# Patient Record
Sex: FEMALE | Race: ASIAN | NOT HISPANIC OR LATINO | URBAN - METROPOLITAN AREA
[De-identification: names, ages, dates, MRNs, and addresses within clinical notes are randomized per-mention and may not be internally consistent; named-entity substitution may affect disease eponyms.]

---

## 2018-08-10 ENCOUNTER — EMERGENCY (EMERGENCY)
Facility: HOSPITAL | Age: 28
LOS: 1 days | Discharge: HOME | End: 2018-08-10
Attending: EMERGENCY MEDICINE
Payer: COMMERCIAL

## 2018-08-10 VITALS
OXYGEN SATURATION: 100 % | DIASTOLIC BLOOD PRESSURE: 69 MMHG | HEART RATE: 54 BPM | SYSTOLIC BLOOD PRESSURE: 97 MMHG | RESPIRATION RATE: 17 BRPM

## 2018-08-10 VITALS
TEMPERATURE: 98 F | OXYGEN SATURATION: 100 % | DIASTOLIC BLOOD PRESSURE: 83 MMHG | HEART RATE: 94 BPM | RESPIRATION RATE: 20 BRPM | SYSTOLIC BLOOD PRESSURE: 122 MMHG

## 2018-08-10 LAB
ALBUMIN SERPL ELPH-MCNC: 4.5 G/DL — SIGNIFICANT CHANGE UP (ref 3.3–5)
ALP SERPL-CCNC: 49 U/L — SIGNIFICANT CHANGE UP (ref 40–120)
ALT FLD-CCNC: 8 U/L — LOW (ref 10–45)
ANION GAP SERPL CALC-SCNC: 11 MMOL/L — SIGNIFICANT CHANGE UP (ref 5–17)
APPEARANCE UR: CLEAR — SIGNIFICANT CHANGE UP
AST SERPL-CCNC: 14 U/L — SIGNIFICANT CHANGE UP (ref 10–40)
BASOPHILS # BLD AUTO: 0 K/UL — SIGNIFICANT CHANGE UP (ref 0–0.2)
BASOPHILS NFR BLD AUTO: 0.7 % — SIGNIFICANT CHANGE UP (ref 0–2)
BILIRUB SERPL-MCNC: 0.2 MG/DL — SIGNIFICANT CHANGE UP (ref 0.2–1.2)
BILIRUB UR-MCNC: NEGATIVE — SIGNIFICANT CHANGE UP
BUN SERPL-MCNC: 9 MG/DL — SIGNIFICANT CHANGE UP (ref 7–23)
CALCIUM SERPL-MCNC: 9.3 MG/DL — SIGNIFICANT CHANGE UP (ref 8.4–10.5)
CHLORIDE SERPL-SCNC: 104 MMOL/L — SIGNIFICANT CHANGE UP (ref 96–108)
CO2 SERPL-SCNC: 25 MMOL/L — SIGNIFICANT CHANGE UP (ref 22–31)
COLOR SPEC: YELLOW — SIGNIFICANT CHANGE UP
CREAT SERPL-MCNC: 0.77 MG/DL — SIGNIFICANT CHANGE UP (ref 0.5–1.3)
DIFF PNL FLD: NEGATIVE — SIGNIFICANT CHANGE UP
EOSINOPHIL # BLD AUTO: 0.4 K/UL — SIGNIFICANT CHANGE UP (ref 0–0.5)
EOSINOPHIL NFR BLD AUTO: 7.2 % — HIGH (ref 0–6)
EPI CELLS # UR: SIGNIFICANT CHANGE UP /HPF
GLUCOSE SERPL-MCNC: 112 MG/DL — HIGH (ref 70–99)
GLUCOSE UR QL: NEGATIVE — SIGNIFICANT CHANGE UP
HCT VFR BLD CALC: 40.6 % — SIGNIFICANT CHANGE UP (ref 34.5–45)
HGB BLD-MCNC: 13.6 G/DL — SIGNIFICANT CHANGE UP (ref 11.5–15.5)
KETONES UR-MCNC: ABNORMAL
LEUKOCYTE ESTERASE UR-ACNC: NEGATIVE — SIGNIFICANT CHANGE UP
LIDOCAIN IGE QN: 51 U/L — SIGNIFICANT CHANGE UP (ref 7–60)
LYMPHOCYTES # BLD AUTO: 1.6 K/UL — SIGNIFICANT CHANGE UP (ref 1–3.3)
LYMPHOCYTES # BLD AUTO: 26 % — SIGNIFICANT CHANGE UP (ref 13–44)
MAGNESIUM SERPL-MCNC: 2.1 MG/DL — SIGNIFICANT CHANGE UP (ref 1.6–2.6)
MCHC RBC-ENTMCNC: 29.6 PG — SIGNIFICANT CHANGE UP (ref 27–34)
MCHC RBC-ENTMCNC: 33.6 GM/DL — SIGNIFICANT CHANGE UP (ref 32–36)
MCV RBC AUTO: 88 FL — SIGNIFICANT CHANGE UP (ref 80–100)
MONOCYTES # BLD AUTO: 0.4 K/UL — SIGNIFICANT CHANGE UP (ref 0–0.9)
MONOCYTES NFR BLD AUTO: 6.5 % — SIGNIFICANT CHANGE UP (ref 2–14)
NEUTROPHILS # BLD AUTO: 3.6 K/UL — SIGNIFICANT CHANGE UP (ref 1.8–7.4)
NEUTROPHILS NFR BLD AUTO: 59.5 % — SIGNIFICANT CHANGE UP (ref 43–77)
NITRITE UR-MCNC: NEGATIVE — SIGNIFICANT CHANGE UP
PH UR: 6 — SIGNIFICANT CHANGE UP (ref 5–8)
PLATELET # BLD AUTO: 247 K/UL — SIGNIFICANT CHANGE UP (ref 150–400)
POTASSIUM SERPL-MCNC: 3.5 MMOL/L — SIGNIFICANT CHANGE UP (ref 3.5–5.3)
POTASSIUM SERPL-SCNC: 3.5 MMOL/L — SIGNIFICANT CHANGE UP (ref 3.5–5.3)
PROT SERPL-MCNC: 7 G/DL — SIGNIFICANT CHANGE UP (ref 6–8.3)
PROT UR-MCNC: SIGNIFICANT CHANGE UP
RBC # BLD: 4.61 M/UL — SIGNIFICANT CHANGE UP (ref 3.8–5.2)
RBC # FLD: 11.6 % — SIGNIFICANT CHANGE UP (ref 10.3–14.5)
RBC CASTS # UR COMP ASSIST: SIGNIFICANT CHANGE UP /HPF (ref 0–2)
SODIUM SERPL-SCNC: 140 MMOL/L — SIGNIFICANT CHANGE UP (ref 135–145)
SP GR SPEC: 1.03 — HIGH (ref 1.01–1.02)
UROBILINOGEN FLD QL: NEGATIVE — SIGNIFICANT CHANGE UP
WBC # BLD: 6 K/UL — SIGNIFICANT CHANGE UP (ref 3.8–10.5)
WBC # FLD AUTO: 6 K/UL — SIGNIFICANT CHANGE UP (ref 3.8–10.5)
WBC UR QL: SIGNIFICANT CHANGE UP /HPF (ref 0–5)

## 2018-08-10 PROCEDURE — 99283 EMERGENCY DEPT VISIT LOW MDM: CPT

## 2018-08-10 PROCEDURE — 83735 ASSAY OF MAGNESIUM: CPT

## 2018-08-10 PROCEDURE — 85027 COMPLETE CBC AUTOMATED: CPT

## 2018-08-10 PROCEDURE — 80053 COMPREHEN METABOLIC PANEL: CPT

## 2018-08-10 PROCEDURE — 81001 URINALYSIS AUTO W/SCOPE: CPT

## 2018-08-10 PROCEDURE — 99284 EMERGENCY DEPT VISIT MOD MDM: CPT

## 2018-08-10 PROCEDURE — 83690 ASSAY OF LIPASE: CPT

## 2018-08-10 RX ORDER — ACETAMINOPHEN 500 MG
1000 TABLET ORAL ONCE
Qty: 0 | Refills: 0 | Status: COMPLETED | OUTPATIENT
Start: 2018-08-10 | End: 2018-08-10

## 2018-08-10 RX ADMIN — Medication 30 MILLILITER(S): at 22:43

## 2018-08-10 RX ADMIN — Medication 10 MILLIGRAM(S): at 22:43

## 2018-08-10 NOTE — ED ADULT NURSE NOTE - OBJECTIVE STATEMENT
27 yo female A&OX3 presents to the ED with the c/o abd pain. Pt states that she has been having abd pain and diarrhea x 2 weeks. Denies any recent sick contacts. no n/v, fevers or chills. Abd round, soft non tender and non distended. Pt describes pain as cramping like. Pt states that she did not have BM today, but had 3 episodes of watery stool, non bloody. + decrease in po intake. No recent abd surgeries. MAEX4

## 2018-08-10 NOTE — ED PROVIDER NOTE - PHYSICAL EXAMINATION
Attn - alert, nad, no pallor, no pallor, jaundice.  moist mm Attn - alert, nad, no pallor, no pallor, jaundice.  moist mm. lungs-, cor-, abdo- soft, flat, ND, min epigastric tenderness. no guarding or rebound, no CVAT, Neuro - nonfocal, skin-

## 2018-08-10 NOTE — ED PROVIDER NOTE - PLAN OF CARE
Follow up with your Primary Care Physician within the next 2-3 days  Bring a copy of your test results with you to your appointment  Take Pepcid OTC as needed for pain, follow up with GI  Return to the Emergency Room if you experience new or worsening symptoms

## 2018-08-10 NOTE — ED PROVIDER NOTE - PROGRESS NOTE DETAILS
Pt declined Tylenol, pending UA, will re-evaluate. Patient states she is feeling better and pain has subsided. Labs including UA reviewed. Pt advised to follow up with PCP and GI. If symptoms worsen, return to ED. c/d/w Dr. Navarro

## 2018-08-10 NOTE — ED ADULT NURSE NOTE - NSIMPLEMENTINTERV_GEN_ALL_ED
Implemented All Universal Safety Interventions:  [place] to call system. Call bell, personal items and telephone within reach. Instruct patient to call for assistance. Room bathroom lighting operational. Non-slip footwear when patient is off stretcher. Physically safe environment: no spills, clutter or unnecessary equipment. Stretcher in lowest position, wheels locked, appropriate side rails in place.

## 2018-08-10 NOTE — ED PROVIDER NOTE - OBJECTIVE STATEMENT
Attn - pt seen in [facility] 26.5 - pt c/o epigastric colicky pain x 2 weeks with diarrhea x 4 yesterday.  NO: trauma, fever, gi bleeding, bloating, n/v.  no prior episodes.  no exacerbating or relieving factors.  no abdo distention. no radiation.  resp or gu sympt.  No travel, ill contacts, family hx, or recent Abx.  Pt states pain lasts few mins and stops, but occurs several times every hour. Patient is 28 y.o female, denies significant pmhx presents to ED c/o epigastric abdominal pain x 2-3 wks. Pt describes pain as crampy, intermittent w/ no exacerbating or relieving factors. Pt reports having 4 episodes of watery, non bloody stools yesterday w/ increasing bloating sensation to abdomen prompting visit to ED. Pt denies fever, chills, nausea, vomiting, recent travel, sick contacts, chest pain, sob, dyspnea, dysuria, hematuria, back pain or other associated symptoms.     Attn - pt seen in [facility] 26.5 - pt c/o epigastric colicky pain x 2 weeks with diarrhea x 4 yesterday.  NO: trauma, fever, gi bleeding, bloating, n/v.  no prior episodes.  no exacerbating or relieving factors.  no abdo distention. no radiation.  resp or gu sympt.  No travel, ill contacts, family hx, or recent Abx.  Pt states pain lasts few mins and stops, but occurs several times every hour.

## 2018-08-10 NOTE — ED PROVIDER NOTE - CARE PLAN
Principal Discharge DX:	Epigastric abdominal pain  Assessment and plan of treatment:	Follow up with your Primary Care Physician within the next 2-3 days  Bring a copy of your test results with you to your appointment  Take Pepcid OTC as needed for pain, follow up with GI  Return to the Emergency Room if you experience new or worsening symptoms

## 2019-02-13 ENCOUNTER — ASOB RESULT (OUTPATIENT)
Age: 29
End: 2019-02-13

## 2019-02-13 ENCOUNTER — APPOINTMENT (OUTPATIENT)
Dept: ANTEPARTUM | Facility: CLINIC | Age: 29
End: 2019-02-13
Payer: COMMERCIAL

## 2019-02-13 PROBLEM — Z00.00 ENCOUNTER FOR PREVENTIVE HEALTH EXAMINATION: Status: ACTIVE | Noted: 2019-02-13

## 2019-02-13 PROCEDURE — 36416 COLLJ CAPILLARY BLOOD SPEC: CPT

## 2019-02-13 PROCEDURE — 76801 OB US < 14 WKS SINGLE FETUS: CPT

## 2019-02-13 PROCEDURE — 76813 OB US NUCHAL MEAS 1 GEST: CPT

## 2019-02-13 PROCEDURE — 76814 OB US NUCHAL MEAS ADD-ON: CPT

## 2019-02-13 PROCEDURE — 76802 OB US < 14 WKS ADDL FETUS: CPT

## 2019-03-13 ENCOUNTER — APPOINTMENT (OUTPATIENT)
Dept: ANTEPARTUM | Facility: CLINIC | Age: 29
End: 2019-03-13
Payer: COMMERCIAL

## 2019-03-13 ENCOUNTER — ASOB RESULT (OUTPATIENT)
Age: 29
End: 2019-03-13

## 2019-03-13 PROCEDURE — 76815 OB US LIMITED FETUS(S): CPT

## 2019-03-29 ENCOUNTER — APPOINTMENT (OUTPATIENT)
Dept: ANTEPARTUM | Facility: CLINIC | Age: 29
End: 2019-03-29
Payer: COMMERCIAL

## 2019-03-29 ENCOUNTER — ASOB RESULT (OUTPATIENT)
Age: 29
End: 2019-03-29

## 2019-03-29 PROCEDURE — 76811 OB US DETAILED SNGL FETUS: CPT

## 2019-03-29 PROCEDURE — 76817 TRANSVAGINAL US OBSTETRIC: CPT

## 2019-03-29 PROCEDURE — 99242 OFF/OP CONSLTJ NEW/EST SF 20: CPT | Mod: 25

## 2019-03-29 PROCEDURE — 76812 OB US DETAILED ADDL FETUS: CPT

## 2019-03-29 PROCEDURE — 76820 UMBILICAL ARTERY ECHO: CPT

## 2019-04-01 DIAGNOSIS — O30.032 TWIN PREGNANCY, MONOCHORIONIC/DIAMNIOTIC, SECOND TRIMESTER: ICD-10-CM

## 2019-04-09 ENCOUNTER — OUTPATIENT (OUTPATIENT)
Dept: OUTPATIENT SERVICES | Age: 29
LOS: 1 days | Discharge: HOME | End: 2019-04-09

## 2019-04-10 ENCOUNTER — APPOINTMENT (OUTPATIENT)
Dept: PEDIATRIC CARDIOLOGY | Facility: CLINIC | Age: 29
End: 2019-04-10
Payer: COMMERCIAL

## 2019-04-10 PROCEDURE — 99242 OFF/OP CONSLTJ NEW/EST SF 20: CPT | Mod: 25

## 2019-04-10 PROCEDURE — 93325 DOPPLER ECHO COLOR FLOW MAPG: CPT | Mod: 59

## 2019-04-10 PROCEDURE — 76827 ECHO EXAM OF FETAL HEART: CPT | Mod: 59

## 2019-04-10 PROCEDURE — 76827 ECHO EXAM OF FETAL HEART: CPT

## 2019-04-10 PROCEDURE — 76825 ECHO EXAM OF FETAL HEART: CPT | Mod: 59

## 2019-04-10 PROCEDURE — 76825 ECHO EXAM OF FETAL HEART: CPT

## 2019-04-12 ENCOUNTER — ASOB RESULT (OUTPATIENT)
Age: 29
End: 2019-04-12

## 2019-04-12 ENCOUNTER — APPOINTMENT (OUTPATIENT)
Dept: ANTEPARTUM | Facility: CLINIC | Age: 29
End: 2019-04-12
Payer: COMMERCIAL

## 2019-04-12 PROCEDURE — 99241 OFFICE CONSULTATION NEW/ESTAB PATIENT 15 MIN: CPT | Mod: 25

## 2019-04-12 PROCEDURE — 76819 FETAL BIOPHYS PROFIL W/O NST: CPT

## 2019-04-12 PROCEDURE — 76820 UMBILICAL ARTERY ECHO: CPT

## 2019-04-22 ENCOUNTER — APPOINTMENT (OUTPATIENT)
Dept: ANTEPARTUM | Facility: CLINIC | Age: 29
End: 2019-04-22
Payer: COMMERCIAL

## 2019-04-22 ENCOUNTER — ASOB RESULT (OUTPATIENT)
Age: 29
End: 2019-04-22

## 2019-04-22 PROCEDURE — 76819 FETAL BIOPHYS PROFIL W/O NST: CPT

## 2019-04-22 PROCEDURE — 76816 OB US FOLLOW-UP PER FETUS: CPT

## 2019-04-26 ENCOUNTER — APPOINTMENT (OUTPATIENT)
Dept: ANTEPARTUM | Facility: CLINIC | Age: 29
End: 2019-04-26
Payer: COMMERCIAL

## 2019-04-26 ENCOUNTER — ASOB RESULT (OUTPATIENT)
Age: 29
End: 2019-04-26

## 2019-04-26 PROCEDURE — 76820 UMBILICAL ARTERY ECHO: CPT

## 2019-04-26 PROCEDURE — 99242 OFF/OP CONSLTJ NEW/EST SF 20: CPT | Mod: 25

## 2019-04-26 PROCEDURE — 76815 OB US LIMITED FETUS(S): CPT

## 2019-05-01 ENCOUNTER — APPOINTMENT (OUTPATIENT)
Dept: ANTEPARTUM | Facility: CLINIC | Age: 29
End: 2019-05-01
Payer: MEDICAID

## 2019-05-01 ENCOUNTER — ASOB RESULT (OUTPATIENT)
Age: 29
End: 2019-05-01

## 2019-05-01 PROCEDURE — 76820 UMBILICAL ARTERY ECHO: CPT

## 2019-05-01 PROCEDURE — 76815 OB US LIMITED FETUS(S): CPT | Mod: 59

## 2019-05-01 PROCEDURE — 76821 MIDDLE CEREBRAL ARTERY ECHO: CPT

## 2019-05-09 ENCOUNTER — APPOINTMENT (OUTPATIENT)
Dept: ANTEPARTUM | Facility: CLINIC | Age: 29
End: 2019-05-09
Payer: MEDICAID

## 2019-05-09 ENCOUNTER — OUTPATIENT (OUTPATIENT)
Dept: OUTPATIENT SERVICES | Facility: HOSPITAL | Age: 29
LOS: 1 days | End: 2019-05-09

## 2019-05-09 ENCOUNTER — ASOB RESULT (OUTPATIENT)
Age: 29
End: 2019-05-09

## 2019-05-09 ENCOUNTER — APPOINTMENT (OUTPATIENT)
Dept: ANTEPARTUM | Facility: HOSPITAL | Age: 29
End: 2019-05-09

## 2019-05-09 ENCOUNTER — APPOINTMENT (OUTPATIENT)
Dept: OTHER | Facility: CLINIC | Age: 29
End: 2019-05-09
Payer: COMMERCIAL

## 2019-05-09 PROCEDURE — 99212 OFFICE O/P EST SF 10 MIN: CPT | Mod: 25

## 2019-05-09 PROCEDURE — 76820 UMBILICAL ARTERY ECHO: CPT | Mod: 59

## 2019-05-09 PROCEDURE — 76818 FETAL BIOPHYS PROFILE W/NST: CPT | Mod: 26

## 2019-05-09 PROCEDURE — 76821 MIDDLE CEREBRAL ARTERY ECHO: CPT

## 2019-05-09 PROCEDURE — XXXXX: CPT

## 2019-05-09 PROCEDURE — 76816 OB US FOLLOW-UP PER FETUS: CPT

## 2019-05-16 ENCOUNTER — ASOB RESULT (OUTPATIENT)
Age: 29
End: 2019-05-16

## 2019-05-16 ENCOUNTER — APPOINTMENT (OUTPATIENT)
Dept: ANTEPARTUM | Facility: CLINIC | Age: 29
End: 2019-05-16
Payer: MEDICAID

## 2019-05-16 PROCEDURE — 76820 UMBILICAL ARTERY ECHO: CPT | Mod: 59

## 2019-05-16 PROCEDURE — 76819 FETAL BIOPHYS PROFIL W/O NST: CPT | Mod: 59

## 2019-05-17 DIAGNOSIS — O30.032 TWIN PREGNANCY, MONOCHORIONIC/DIAMNIOTIC, SECOND TRIMESTER: ICD-10-CM

## 2019-05-17 DIAGNOSIS — O36.5922 MATERNAL CARE FOR OTHER KNOWN OR SUSPECTED POOR FETAL GROWTH, SECOND TRIMESTER, FETUS 2: ICD-10-CM

## 2019-05-17 DIAGNOSIS — Z3A.24 24 WEEKS GESTATION OF PREGNANCY: ICD-10-CM

## 2019-05-23 ENCOUNTER — ASOB RESULT (OUTPATIENT)
Age: 29
End: 2019-05-23

## 2019-05-23 ENCOUNTER — APPOINTMENT (OUTPATIENT)
Dept: ANTEPARTUM | Facility: CLINIC | Age: 29
End: 2019-05-23
Payer: MEDICAID

## 2019-05-23 ENCOUNTER — APPOINTMENT (OUTPATIENT)
Dept: ANTEPARTUM | Facility: HOSPITAL | Age: 29
End: 2019-05-23

## 2019-05-23 ENCOUNTER — OUTPATIENT (OUTPATIENT)
Dept: OUTPATIENT SERVICES | Facility: HOSPITAL | Age: 29
LOS: 1 days | End: 2019-05-23

## 2019-05-23 PROCEDURE — 76821 MIDDLE CEREBRAL ARTERY ECHO: CPT

## 2019-05-23 PROCEDURE — 76820 UMBILICAL ARTERY ECHO: CPT | Mod: 59

## 2019-05-23 PROCEDURE — 76816 OB US FOLLOW-UP PER FETUS: CPT

## 2019-05-23 PROCEDURE — 76818 FETAL BIOPHYS PROFILE W/NST: CPT | Mod: 26

## 2019-05-30 ENCOUNTER — ASOB RESULT (OUTPATIENT)
Age: 29
End: 2019-05-30

## 2019-05-30 ENCOUNTER — APPOINTMENT (OUTPATIENT)
Dept: ANTEPARTUM | Facility: HOSPITAL | Age: 29
End: 2019-05-30

## 2019-05-30 ENCOUNTER — OUTPATIENT (OUTPATIENT)
Dept: OUTPATIENT SERVICES | Facility: HOSPITAL | Age: 29
LOS: 1 days | End: 2019-05-30

## 2019-05-30 ENCOUNTER — APPOINTMENT (OUTPATIENT)
Dept: ANTEPARTUM | Facility: CLINIC | Age: 29
End: 2019-05-30
Payer: MEDICAID

## 2019-05-30 PROCEDURE — 76820 UMBILICAL ARTERY ECHO: CPT

## 2019-05-30 PROCEDURE — 76818 FETAL BIOPHYS PROFILE W/NST: CPT | Mod: 26

## 2019-05-30 PROCEDURE — 76821 MIDDLE CEREBRAL ARTERY ECHO: CPT | Mod: 59

## 2019-06-05 DIAGNOSIS — O36.5922 MATERNAL CARE FOR OTHER KNOWN OR SUSPECTED POOR FETAL GROWTH, SECOND TRIMESTER, FETUS 2: ICD-10-CM

## 2019-06-05 DIAGNOSIS — O30.032 TWIN PREGNANCY, MONOCHORIONIC/DIAMNIOTIC, SECOND TRIMESTER: ICD-10-CM

## 2019-06-05 DIAGNOSIS — O36.5921 MATERNAL CARE FOR OTHER KNOWN OR SUSPECTED POOR FETAL GROWTH, SECOND TRIMESTER, FETUS 1: ICD-10-CM

## 2019-06-06 ENCOUNTER — OUTPATIENT (OUTPATIENT)
Dept: OUTPATIENT SERVICES | Facility: HOSPITAL | Age: 29
LOS: 1 days | End: 2019-06-06

## 2019-06-06 ENCOUNTER — APPOINTMENT (OUTPATIENT)
Dept: ANTEPARTUM | Facility: CLINIC | Age: 29
End: 2019-06-06
Payer: MEDICAID

## 2019-06-06 ENCOUNTER — ASOB RESULT (OUTPATIENT)
Age: 29
End: 2019-06-06

## 2019-06-06 ENCOUNTER — APPOINTMENT (OUTPATIENT)
Dept: ANTEPARTUM | Facility: HOSPITAL | Age: 29
End: 2019-06-06

## 2019-06-06 PROCEDURE — 76821 MIDDLE CEREBRAL ARTERY ECHO: CPT

## 2019-06-06 PROCEDURE — 76820 UMBILICAL ARTERY ECHO: CPT

## 2019-06-06 PROCEDURE — 76818 FETAL BIOPHYS PROFILE W/NST: CPT | Mod: 26,59

## 2019-06-07 DIAGNOSIS — O36.5922 MATERNAL CARE FOR OTHER KNOWN OR SUSPECTED POOR FETAL GROWTH, SECOND TRIMESTER, FETUS 2: ICD-10-CM

## 2019-06-07 DIAGNOSIS — Z3A.27 27 WEEKS GESTATION OF PREGNANCY: ICD-10-CM

## 2019-06-07 DIAGNOSIS — O36.5921 MATERNAL CARE FOR OTHER KNOWN OR SUSPECTED POOR FETAL GROWTH, SECOND TRIMESTER, FETUS 1: ICD-10-CM

## 2019-06-07 DIAGNOSIS — O30.032 TWIN PREGNANCY, MONOCHORIONIC/DIAMNIOTIC, SECOND TRIMESTER: ICD-10-CM

## 2019-06-12 DIAGNOSIS — O36.5922 MATERNAL CARE FOR OTHER KNOWN OR SUSPECTED POOR FETAL GROWTH, SECOND TRIMESTER, FETUS 2: ICD-10-CM

## 2019-06-12 DIAGNOSIS — O36.5921 MATERNAL CARE FOR OTHER KNOWN OR SUSPECTED POOR FETAL GROWTH, SECOND TRIMESTER, FETUS 1: ICD-10-CM

## 2019-06-12 DIAGNOSIS — O30.032 TWIN PREGNANCY, MONOCHORIONIC/DIAMNIOTIC, SECOND TRIMESTER: ICD-10-CM

## 2019-06-13 ENCOUNTER — OUTPATIENT (OUTPATIENT)
Dept: OUTPATIENT SERVICES | Facility: HOSPITAL | Age: 29
LOS: 1 days | End: 2019-06-13

## 2019-06-13 ENCOUNTER — APPOINTMENT (OUTPATIENT)
Dept: ANTEPARTUM | Facility: CLINIC | Age: 29
End: 2019-06-13
Payer: MEDICAID

## 2019-06-13 ENCOUNTER — ASOB RESULT (OUTPATIENT)
Age: 29
End: 2019-06-13

## 2019-06-13 ENCOUNTER — APPOINTMENT (OUTPATIENT)
Dept: ANTEPARTUM | Facility: HOSPITAL | Age: 29
End: 2019-06-13

## 2019-06-13 ENCOUNTER — INPATIENT (INPATIENT)
Facility: HOSPITAL | Age: 29
LOS: 1 days | Discharge: HOME | End: 2019-06-15
Attending: SPECIALIST | Admitting: SPECIALIST
Payer: MEDICAID

## 2019-06-13 VITALS
OXYGEN SATURATION: 100 % | TEMPERATURE: 98 F | SYSTOLIC BLOOD PRESSURE: 110 MMHG | DIASTOLIC BLOOD PRESSURE: 74 MMHG | HEART RATE: 66 BPM | RESPIRATION RATE: 16 BRPM

## 2019-06-13 DIAGNOSIS — O26.899 OTHER SPECIFIED PREGNANCY RELATED CONDITIONS, UNSPECIFIED TRIMESTER: ICD-10-CM

## 2019-06-13 DIAGNOSIS — O47.9 FALSE LABOR, UNSPECIFIED: ICD-10-CM

## 2019-06-13 DIAGNOSIS — Z3A.00 WEEKS OF GESTATION OF PREGNANCY NOT SPECIFIED: ICD-10-CM

## 2019-06-13 LAB
APPEARANCE UR: SIGNIFICANT CHANGE UP
BACTERIA # UR AUTO: NEGATIVE — SIGNIFICANT CHANGE UP
BASOPHILS # BLD AUTO: 0.03 K/UL — SIGNIFICANT CHANGE UP (ref 0–0.2)
BASOPHILS NFR BLD AUTO: 0.4 % — SIGNIFICANT CHANGE UP (ref 0–2)
BILIRUB UR-MCNC: NEGATIVE — SIGNIFICANT CHANGE UP
BLD GP AB SCN SERPL QL: NEGATIVE — SIGNIFICANT CHANGE UP
BLOOD UR QL VISUAL: NEGATIVE — SIGNIFICANT CHANGE UP
COLOR SPEC: SIGNIFICANT CHANGE UP
EOSINOPHIL # BLD AUTO: 0.2 K/UL — SIGNIFICANT CHANGE UP (ref 0–0.5)
EOSINOPHIL NFR BLD AUTO: 2.3 % — SIGNIFICANT CHANGE UP (ref 0–6)
GLUCOSE UR-MCNC: NEGATIVE — SIGNIFICANT CHANGE UP
HCT VFR BLD CALC: 38 % — SIGNIFICANT CHANGE UP (ref 34.5–45)
HGB BLD-MCNC: 12.7 G/DL — SIGNIFICANT CHANGE UP (ref 11.5–15.5)
HYALINE CASTS # UR AUTO: NEGATIVE — SIGNIFICANT CHANGE UP
IMM GRANULOCYTES NFR BLD AUTO: 0.9 % — SIGNIFICANT CHANGE UP (ref 0–1.5)
KETONES UR-MCNC: NEGATIVE — SIGNIFICANT CHANGE UP
LEUKOCYTE ESTERASE UR-ACNC: NEGATIVE — SIGNIFICANT CHANGE UP
LYMPHOCYTES # BLD AUTO: 1.08 K/UL — SIGNIFICANT CHANGE UP (ref 1–3.3)
LYMPHOCYTES # BLD AUTO: 12.7 % — LOW (ref 13–44)
MAGNESIUM SERPL-MCNC: 1.6 MG/DL — SIGNIFICANT CHANGE UP (ref 1.6–2.6)
MCHC RBC-ENTMCNC: 29.7 PG — SIGNIFICANT CHANGE UP (ref 27–34)
MCHC RBC-ENTMCNC: 33.4 % — SIGNIFICANT CHANGE UP (ref 32–36)
MCV RBC AUTO: 89 FL — SIGNIFICANT CHANGE UP (ref 80–100)
MONOCYTES # BLD AUTO: 0.45 K/UL — SIGNIFICANT CHANGE UP (ref 0–0.9)
MONOCYTES NFR BLD AUTO: 5.3 % — SIGNIFICANT CHANGE UP (ref 2–14)
NEUTROPHILS # BLD AUTO: 6.68 K/UL — SIGNIFICANT CHANGE UP (ref 1.8–7.4)
NEUTROPHILS NFR BLD AUTO: 78.4 % — HIGH (ref 43–77)
NITRITE UR-MCNC: NEGATIVE — SIGNIFICANT CHANGE UP
NRBC # FLD: 0 K/UL — SIGNIFICANT CHANGE UP (ref 0–0)
PH UR: 7 — SIGNIFICANT CHANGE UP (ref 5–8)
PLATELET # BLD AUTO: 227 K/UL — SIGNIFICANT CHANGE UP (ref 150–400)
PMV BLD: 10.4 FL — SIGNIFICANT CHANGE UP (ref 7–13)
PROT UR-MCNC: 10 — SIGNIFICANT CHANGE UP
RBC # BLD: 4.27 M/UL — SIGNIFICANT CHANGE UP (ref 3.8–5.2)
RBC # FLD: 12.5 % — SIGNIFICANT CHANGE UP (ref 10.3–14.5)
RBC CASTS # UR COMP ASSIST: SIGNIFICANT CHANGE UP (ref 0–?)
RH IG SCN BLD-IMP: POSITIVE — SIGNIFICANT CHANGE UP
RH IG SCN BLD-IMP: POSITIVE — SIGNIFICANT CHANGE UP
SP GR SPEC: 1.02 — SIGNIFICANT CHANGE UP (ref 1–1.04)
SQUAMOUS # UR AUTO: SIGNIFICANT CHANGE UP
UROBILINOGEN FLD QL: NORMAL — SIGNIFICANT CHANGE UP
WBC # BLD: 8.52 K/UL — SIGNIFICANT CHANGE UP (ref 3.8–10.5)
WBC # FLD AUTO: 8.52 K/UL — SIGNIFICANT CHANGE UP (ref 3.8–10.5)
WBC UR QL: SIGNIFICANT CHANGE UP (ref 0–?)

## 2019-06-13 PROCEDURE — 76821 MIDDLE CEREBRAL ARTERY ECHO: CPT

## 2019-06-13 PROCEDURE — 76818 FETAL BIOPHYS PROFILE W/NST: CPT | Mod: 26,59

## 2019-06-13 PROCEDURE — 99214 OFFICE O/P EST MOD 30 MIN: CPT | Mod: 25

## 2019-06-13 PROCEDURE — 99223 1ST HOSP IP/OBS HIGH 75: CPT | Mod: GC

## 2019-06-13 PROCEDURE — 76820 UMBILICAL ARTERY ECHO: CPT | Mod: 59

## 2019-06-13 PROCEDURE — 76816 OB US FOLLOW-UP PER FETUS: CPT | Mod: 59

## 2019-06-13 RX ORDER — AMPICILLIN TRIHYDRATE 250 MG
2 CAPSULE ORAL ONCE
Refills: 0 | Status: COMPLETED | OUTPATIENT
Start: 2019-06-13 | End: 2019-06-13

## 2019-06-13 RX ORDER — MAGNESIUM SULFATE 500 MG/ML
4 VIAL (ML) INJECTION ONCE
Refills: 0 | Status: COMPLETED | OUTPATIENT
Start: 2019-06-13 | End: 2019-06-13

## 2019-06-13 RX ORDER — AMPICILLIN TRIHYDRATE 250 MG
2 CAPSULE ORAL EVERY 6 HOURS
Refills: 0 | Status: DISCONTINUED | OUTPATIENT
Start: 2019-06-13 | End: 2019-06-15

## 2019-06-13 RX ORDER — SODIUM CHLORIDE 9 MG/ML
1000 INJECTION, SOLUTION INTRAVENOUS
Refills: 0 | Status: DISCONTINUED | OUTPATIENT
Start: 2019-06-13 | End: 2019-06-14

## 2019-06-13 RX ORDER — MAGNESIUM SULFATE 500 MG/ML
2 VIAL (ML) INJECTION
Qty: 40 | Refills: 0 | Status: DISCONTINUED | OUTPATIENT
Start: 2019-06-13 | End: 2019-06-13

## 2019-06-13 RX ORDER — FOLIC ACID 0.8 MG
1 TABLET ORAL DAILY
Refills: 0 | Status: DISCONTINUED | OUTPATIENT
Start: 2019-06-13 | End: 2019-06-15

## 2019-06-13 RX ORDER — AMPICILLIN TRIHYDRATE 250 MG
CAPSULE ORAL
Refills: 0 | Status: DISCONTINUED | OUTPATIENT
Start: 2019-06-13 | End: 2019-06-15

## 2019-06-13 RX ADMIN — Medication 12 MILLIGRAM(S): at 14:47

## 2019-06-13 RX ADMIN — Medication 300 GRAM(S): at 14:41

## 2019-06-13 RX ADMIN — SODIUM CHLORIDE 75 MILLILITER(S): 9 INJECTION, SOLUTION INTRAVENOUS at 18:46

## 2019-06-13 RX ADMIN — Medication 216 GRAM(S): at 20:49

## 2019-06-13 RX ADMIN — Medication 216 GRAM(S): at 14:30

## 2019-06-13 NOTE — CONSULT NOTE ADULT - SUBJECTIVE AND OBJECTIVE BOX
MFM Fellow    Patient seen at bedside.   States she has no acute complaints. Denies any cramping, contractions, loss of fluid, vaginal bleeding.     MEDICATIONS  (STANDING):  ampicillin  IVPB      ampicillin  IVPB 2 Gram(s) IV Intermittent every 6 hours  betamethasone Injectable 12 milliGRAM(s) IntraMuscular every 24 hours  folic acid 1 milliGRAM(s) Oral daily  lactated ringers. 1000 milliLiter(s) (75 mL/Hr) IV Continuous <Continuous>  magnesium sulfate Infusion 2 Gm/Hr (50 mL/Hr) IV Continuous <Continuous>  prenatal multivitamin 1 Tablet(s) Oral daily    MEDICATIONS  (PRN):      Allergies    No Known Allergies    Intolerances        12 point ROS negative except as outlined above    Vital Signs Last 24 Hrs  T(C): 37.0 (13 Jun 2019 12:26), Max: 37.0 (13 Jun 2019 12:26)  T(F): 98.6 (13 Jun 2019 12:26), Max: 98.6 (13 Jun 2019 12:26)  HR: 88 (13 Jun 2019 15:08) (60 - 92)  BP: 111/72 (13 Jun 2019 15:12) (99/71 - 118/77)  BP(mean): --  RR: 18 (13 Jun 2019 12:26) (16 - 18)  SpO2: 97% (13 Jun 2019 15:08) (97% - 100%)    Last Menstrual Period      PHYSICAL EXAM:    GA: NAD, A+0 x 3  CV: RRR  Pulm: CTA BL  Breasts: soft, nontender, no palpable masses  Abd: ( + ) BS, soft, nontender, nondistended, no rebound or guarding,   Incision: clean, dry and intact; steri-strips in place  Uterus: Fundus midline; firm at ___  fb below umbilicus  : lochia WNL; perineum: ____  [place]:   Extremities: no swelling or calf tenderness, reflexes +2 bilaterally          LABS:                RADIOLOGY & ADDITIONAL TESTS: MFM Fellow    Patient seen at bedside.   States she has no acute complaints. Denies any cramping, contractions, loss of fluid, vaginal bleeding. Patient was initially in the ATU and sent down due to contractions noted on toco. Being monitored in the ATU due to mono-di pregnancy and noted to have iAEDV in fetus B  +FMx2    Patient with no significant PMSH    ATU sono 6/13/2019  "A"BPP: 8/8  EFW: 1297 grams vtx   "B" BPP: 8/8  EFW: 954 grams complete breech, followed for IUGR , intermittent AEDV on Doppler  TVS: 2.8-3.0 cm no dynamic changes  SVE: 1/50/-3    12 point ROS negative except as outlined above    Vital Signs Last 24 Hrs  T(C): 37.0 (13 Jun 2019 12:26), Max: 37.0 (13 Jun 2019 12:26)  T(F): 98.6 (13 Jun 2019 12:26), Max: 98.6 (13 Jun 2019 12:26)  HR: 88 (13 Jun 2019 15:08) (60 - 92)  BP: 111/72 (13 Jun 2019 15:12) (99/71 - 118/77)  RR: 18 (13 Jun 2019 12:26) (16 - 18)  SpO2: 97% (13 Jun 2019 15:08) (97% - 100%)      GA: NAD, A+0 x 3  Abd: soft, nontender, nondistended, no rebound or guarding,   Extremities: no swelling or calf tenderness      RADIOLOGY & ADDITIONAL TESTS:

## 2019-06-13 NOTE — CONSULT NOTE ADULT - PROBLEM SELECTOR RECOMMENDATION 9
Patient currently with PTC, cervical exam noted to be 1/long. Plan to reexamine cervix and monitor for any signs of cervical change.  s/p Mg bolus, will only continue magnesium infusion if there are cervical changes  Continue continuous fetal monitoring  Patient to receive ampicillin for GBS ppx  BMZ (6/13-) for FLM  NICU consult    Seen with Dr. Yunier Piedra M Fellow

## 2019-06-13 NOTE — OB PROVIDER H&P - ASSESSMENT
28 y/o  @ 29.6 wks gest, TIUP, mono-di , presents from ATU to rule out  labor pt was having uterine ctxns on NST in ATU pt denies any c/o uterine ctxns vb or lof reports +FM denies any n/v/d denies any fever or chills ap care comp by:   TIUP- mono-di   followed in ATU :  ATU sono 2019  "A"BPP:   EFW: 1297 grams vtx   "B" BPP:   EFW: 954 grams complete breech, followed for IUGR , intermittent AEDV on one umbilical artery otherwise no other ap care comp at this time      abdomen: soft, nt on palp  T(C): 37.0 (19 @ 12:26), Max: 37.0 (19 @ 12:26)  HR: 60 (19 @ 12:32) (60 - 66)  BP: 114/73 (19 @ 12:32) (110/74 - 114/73)  RR: 18 (19 @ 12:26) (16 - 18)  SpO2: 100% (19 @ 11:44) (100% - 100%)  SSE: cervix appears friable  cervix appears 1 cm dilated and posterior   TVS: 2.8-3.0 cm no dynamic changes  SVE: 50/-3    cat 1 FHT  toco: every 2-3 minutes  pt denies any c/o uterine ctxns   d/w dr Charles  d/w dr camacho  d/w dr kyle/ dr calderon  admit to l&D  29.6 wks gest TIUP, mono- di, for MAgnesium Sulfate/ Betamethasone/ Ampicillin   see admission orders      NKA  med/surg / gyn hx: denies  ob hx: denies  meds; PNV  baby ASA qd

## 2019-06-13 NOTE — OB PROVIDER TRIAGE NOTE - LABOR: CERVICAL POSITION
Partially impaired: cannot see medication labels or newsprint, but can see obstacles in path, and the surrounding layout; can count fingers at arm's length
posterior

## 2019-06-13 NOTE — CHART NOTE - NSCHARTNOTEFT_GEN_A_CORE
Pt examined at bedside.  [place]umings shows ctx q2-4 min however pt does not feel any of the ctx.  Pt was examined at 1pm in triage and was 1/long.      SVE: 1/long  FHT: A- baseline 140, mod dl +accel no decel  B- baseline 130, mod dl +accel no decel  [place]: q2-3    Pt is not making cervical change, does not appear to be in labor.   -s/p MgSO4 bolus, maintenance does not need to be continued.    -Pt able to have regular diet.   -c/w BMZ 2nd dose 6/14  - cont monitoring/toco    TLal PGY2

## 2019-06-14 ENCOUNTER — TRANSCRIPTION ENCOUNTER (OUTPATIENT)
Age: 29
End: 2019-06-14

## 2019-06-14 DIAGNOSIS — O60.00 PRETERM LABOR WITHOUT DELIVERY, UNSPECIFIED TRIMESTER: ICD-10-CM

## 2019-06-14 LAB
C TRACH RRNA SPEC QL NAA+PROBE: SIGNIFICANT CHANGE UP
N GONORRHOEA RRNA SPEC QL NAA+PROBE: SIGNIFICANT CHANGE UP
SPECIMEN SOURCE: SIGNIFICANT CHANGE UP

## 2019-06-14 RX ADMIN — Medication 216 GRAM(S): at 14:57

## 2019-06-14 RX ADMIN — Medication 216 GRAM(S): at 02:23

## 2019-06-14 RX ADMIN — Medication 1 MILLIGRAM(S): at 10:31

## 2019-06-14 RX ADMIN — Medication 1 TABLET(S): at 10:31

## 2019-06-14 RX ADMIN — Medication 216 GRAM(S): at 10:30

## 2019-06-14 RX ADMIN — Medication 12 MILLIGRAM(S): at 14:34

## 2019-06-14 RX ADMIN — Medication 216 GRAM(S): at 20:33

## 2019-06-14 NOTE — DISCHARGE NOTE ANTEPARTUM - PLAN OF CARE
continue prenatal care - Follow up with OB Doctor within the week  - Follow up with  unit   - Return with contractions, vaginal bleeding, leaking fluid or decreased fetal movement - Follow up with OB Doctor within the week  - Follow up with  unit on  for ultrasound and BPP, NST. You will have  testing three times a week.  - You are sent home with a rx for blood pressure cuff. Obtain the BP cuff and check BPs twice a day, upon waking and before bed after you have been sitting for 5 min.  - Return with contractions, vaginal bleeding, leaking fluid or decreased fetal movement

## 2019-06-14 NOTE — PROGRESS NOTE ADULT - PROBLEM SELECTOR PLAN 1
- Magnesium Sulfate/ Betamethasone/ Ampicillin  - BMZ#2 6/14 3pm  - f/u GBS  - continuous monitoring overnight    Lina Cardenas MD PGY2 - Betamethasone/ Ampicillin status post Mg  - SVE unchanged  - BMZ#2 6/14 3pm  - f/u GBS  - continuous monitoring overnight    Lina Cardenas MD PGY2

## 2019-06-14 NOTE — DISCHARGE NOTE ANTEPARTUM - MEDICATION SUMMARY - MEDICATIONS TO TAKE
I will START or STAY ON the medications listed below when I get home from the hospital:    blood pressure cuff  -- 1  between cheek and gums 1 to 2 times a day   -- Indication: For home    Low Dose ASA 81 mg oral tablet  -- 1 tab(s) by mouth once a day  -- Indication: For home    Prenatal Multivitamins with Folic Acid 1 mg oral tablet  -- 1 tab(s) by mouth once a day  -- Indication: For home    Fish Oil oral capsule  -- Indication: For home    folic acid 1 mg oral tablet  -- 1 tab(s) by mouth once a day  -- Indication: For home

## 2019-06-14 NOTE — PROGRESS NOTE ADULT - SUBJECTIVE AND OBJECTIVE BOX
Patient seen and examined at bedside, no acute overnight events. No acute complaints. Patient endorses good fetal movement. Denies CP, SOB, N/V, fevers, chills, or any other concerns.    Vital Signs Last 24 Hours  T(C): 36.6 (06-14-19 @ 02:30), Max: 37.0 (06-13-19 @ 12:26)  HR: 73 (06-14-19 @ 03:39) (60 - 113)  BP: 108/66 (06-14-19 @ 03:30) (95/55 - 160/75)  RR: 16 (06-13-19 @ 14:52) (16 - 18)  SpO2: 96% (06-14-19 @ 03:39) (94% - 100%)    I&O's Summary    13 Jun 2019 07:01  -  14 Jun 2019 03:45  --------------------------------------------------------  IN: 450 mL / OUT: 0 mL / NET: 450 mL        Physical Exam:  General: NAD  CV: RR  Lungs: breathing comfortably on RA  Abdomen: soft, gravid, non-tender  SVE:   Ext: no pain or swelling    Labs:             12.7   8.52  )-----------( 227      ( 06-13 @ 15:17 )             38.0         MEDICATIONS  (STANDING):  ampicillin  IVPB      ampicillin  IVPB 2 Gram(s) IV Intermittent every 6 hours  betamethasone Injectable 12 milliGRAM(s) IntraMuscular every 24 hours  folic acid 1 milliGRAM(s) Oral daily  lactated ringers. 1000 milliLiter(s) (75 mL/Hr) IV Continuous <Continuous>  prenatal multivitamin 1 Tablet(s) Oral daily    MEDICATIONS  (PRN): Patient seen and examined at bedside, no acute overnight events. No acute complaints. Patient endorses good fetal movement. Denies CP, SOB, N/V, fevers, chills, or any other concerns.    Vital Signs Last 24 Hours  T(C): 36.6 (06-14-19 @ 02:30), Max: 37.0 (06-13-19 @ 12:26)  HR: 73 (06-14-19 @ 03:39) (60 - 113)  BP: 108/66 (06-14-19 @ 03:30) (95/55 - 160/75)  RR: 16 (06-13-19 @ 14:52) (16 - 18)  SpO2: 96% (06-14-19 @ 03:39) (94% - 100%)    I&O's Summary    13 Jun 2019 07:01  -  14 Jun 2019 03:45  --------------------------------------------------------  IN: 450 mL / OUT: 0 mL / NET: 450 mL        Physical Exam:  General: NAD  CV: RR  Lungs: breathing comfortably on RA  Abdomen: soft, gravid, non-tender  SVE: 1/50/-3  Ext: no pain or swelling    EFM: Baby A baseline 130, mod dl, +accels, -decels  Baby B currently discontinuous, previously baseline 130, mod dl, +accels, -decels  [place]: >q10min    Labs:             12.7   8.52  )-----------( 227      ( 06-13 @ 15:17 )             38.0         MEDICATIONS  (STANDING):  ampicillin  IVPB      ampicillin  IVPB 2 Gram(s) IV Intermittent every 6 hours  betamethasone Injectable 12 milliGRAM(s) IntraMuscular every 24 hours  folic acid 1 milliGRAM(s) Oral daily  lactated ringers. 1000 milliLiter(s) (75 mL/Hr) IV Continuous <Continuous>  prenatal multivitamin 1 Tablet(s) Oral daily    MEDICATIONS  (PRN):

## 2019-06-14 NOTE — DISCHARGE NOTE ANTEPARTUM - CARE PROVIDER_API CALL
Tyshawn Valdovinos)  Obstetrics and Gynecology  [address] [id number]  Phone: [phone]  Fax: [phone]  Follow Up Time:

## 2019-06-14 NOTE — DISCHARGE NOTE ANTEPARTUM - PATIENT PORTAL LINK FT
You can access the [email or web address] Patient Portal, offered by [facility], by registering with the following website: [email or web address]

## 2019-06-14 NOTE — DISCHARGE NOTE ANTEPARTUM - CARE PLAN
Principal Discharge DX:	 contractions  Goal:	continue prenatal care  Assessment and plan of treatment:	- Follow up with OB Doctor within the week  - Follow up with  unit   - Return with contractions, vaginal bleeding, leaking fluid or decreased fetal movement Principal Discharge DX:	 contractions  Goal:	continue prenatal care  Assessment and plan of treatment:	- Follow up with OB Doctor within the week  - Follow up with  unit on  for ultrasound and BPP, NST. You will have  testing three times a week.  - You are sent home with a rx for blood pressure cuff. Obtain the BP cuff and check BPs twice a day, upon waking and before bed after you have been sitting for 5 min.  - Return with contractions, vaginal bleeding, leaking fluid or decreased fetal movement

## 2019-06-15 ENCOUNTER — TRANSCRIPTION ENCOUNTER (OUTPATIENT)
Age: 29
End: 2019-06-15

## 2019-06-15 VITALS
TEMPERATURE: 98 F | DIASTOLIC BLOOD PRESSURE: 55 MMHG | OXYGEN SATURATION: 99 % | SYSTOLIC BLOOD PRESSURE: 103 MMHG | RESPIRATION RATE: 18 BRPM | HEART RATE: 67 BPM

## 2019-06-15 LAB
BACTERIA UR CULT: SIGNIFICANT CHANGE UP
T PALLIDUM AB TITR SER: NEGATIVE — SIGNIFICANT CHANGE UP

## 2019-06-15 PROCEDURE — 99232 SBSQ HOSP IP/OBS MODERATE 35: CPT | Mod: GC

## 2019-06-15 RX ORDER — FOLIC ACID 0.8 MG
1 TABLET ORAL
Qty: 0 | Refills: 0 | DISCHARGE
Start: 2019-06-15

## 2019-06-15 RX ADMIN — Medication 1 TABLET(S): at 08:42

## 2019-06-15 RX ADMIN — Medication 216 GRAM(S): at 08:42

## 2019-06-15 RX ADMIN — Medication 216 GRAM(S): at 02:32

## 2019-06-15 RX ADMIN — Medication 1 MILLIGRAM(S): at 08:42

## 2019-06-15 NOTE — PROGRESS NOTE ADULT - SUBJECTIVE AND OBJECTIVE BOX
HD#2 30.1 w mono/di twins IUGR ctx  mo complaints, fm x 2 sl vb after exam    Physical exam:    Vital Signs Last 24 Hrs  T(C): 36.7 (15 Farzad 2019 10:22), Max: 36.9 (15 Farzad 2019 05:07)  T(F): 98 (15 Farzad 2019 10:22), Max: 98.4 (15 Farzad 2019 05:07)  HR: 67 (15 Farzad 2019 10:22) (67 - 90)  BP: 103/55 (15 Farzad 2019 10:22) (102/58 - 115/65)  BP(mean): --  RR: 18 (15 Farzad 2019 10:22) (15 - 18)  SpO2: 99% (15 Farzad 2019 10:22) (98% - 99%)    Gen: NAD  Abdomen: Gravid  Ext: No calf tenderness    LABS:                        12.7   8.52  )-----------( 227      ( 13 Jun 2019 15:17 )             38.0       Rubella status:     Allergies    No Known Allergies    Intolerances      MEDICATIONS  (STANDING):  ampicillin  IVPB      ampicillin  IVPB 2 Gram(s) IV Intermittent every 6 hours  folic acid 1 milliGRAM(s) Oral daily  prenatal multivitamin 1 Tablet(s) Oral daily    MEDICATIONS  (PRN):        Assessment and Plan: 30 w twins  improved from admission  per [facility] TIW testing  pt has ATU appointment Monday  BP checks at home 1-2x/day if greater than 140/90 call AN asap  all other routine instructions given  discharge home per [facility]

## 2019-06-15 NOTE — DISCHARGE NOTE NURSING/CASE MANAGEMENT/SOCIAL WORK - NSDCDPATPORTLINK_GEN_ALL_CORE
You can access the [facility] Patient Portal, offered by [facility], by registering with the following website: [email or web address]

## 2019-06-15 NOTE — PROGRESS NOTE ADULT - PROBLEM SELECTOR PLAN 1
Neuro: pain well controlled on current regimen   CV: hemodynamically stable  Pulm: O2 sat WNL on RA, increase ambulation  GI: tolerating regular diet  : voiding spontaneously  Heme: SCDs while in bed for DVT ppx  ID: afebrile, no signs of infection; f/u GBS  Fetal: status post BAM;  contractions and intermittent AEDV and NRNST in baby B; continue BID 2hr NST for surveillance  Dispo: continue inpatient care    Lina Cardenas MD PGY2

## 2019-06-15 NOTE — PROGRESS NOTE ADULT - ASSESSMENT
28 y/o  @ 30.1 wks gest, TIUP, mono-di with iAEDV on fetus B with  contractions
29yoF  @ 29.6 wks gest, TIUP, mono-di, presents from ATU to rule out  labor pt was having uterine ctxns on NST in ATU pt denies any c/o uterine ctxns vb or lof reports.
29yoF  at 30wk 1 day TIUP, mono-di admitted for  contractions and intermittent AEDV and NRNST in baby B.

## 2019-06-15 NOTE — PROGRESS NOTE ADULT - PROBLEM SELECTOR PLAN 1
No further contractions noted on NST  Patient s/p BMZ (6/13-6/14)  Fetal tracing have remained reassuring    Plan to discharge home with ATU outpatient followup 3x/week  Patient states that she has an appointment on Monday  Will send home with BP cuff to monitor BP     Seen with Dr. Tanisha Piedra M Fellow

## 2019-06-15 NOTE — PROGRESS NOTE ADULT - SUBJECTIVE AND OBJECTIVE BOX
Patient seen and examined at bedside, no acute overnight events. No acute complaints. Patient endorses good fetal movement. Patient is ambulating and tolerating regular diet. Denies CP, SOB, N/V, fevers, chills, or any other concerns.    Vital Signs Last 24 Hours  T(C): 36.7 (06-15-19 @ 01:43), Max: 36.9 (06-14-19 @ 07:11)  HR: 90 (06-15-19 @ 01:43) (64 - 105)  BP: 115/65 (06-15-19 @ 01:43) (99/60 - 120/69)  RR: 18 (06-15-19 @ 01:43) (15 - 18)  SpO2: 98% (06-15-19 @ 01:43) (96% - 100%)    I&O's Summary    13 Jun 2019 07:01  -  14 Jun 2019 07:00  --------------------------------------------------------  IN: 450 mL / OUT: 0 mL / NET: 450 mL        Physical Exam:  General: NAD  CV: RR  Lungs: breathing comfortably on RA  Abdomen: soft, gravid, non-tender  SVE:   Ext: no pain or swelling    EFM: baseline , mod dl, +accels, -decels  [place]: qmin    Labs:             12.7   8.52  )-----------( 227      ( 06-13 @ 15:17 )             38.0         MEDICATIONS  (STANDING):  ampicillin  IVPB      ampicillin  IVPB 2 Gram(s) IV Intermittent every 6 hours  folic acid 1 milliGRAM(s) Oral daily  prenatal multivitamin 1 Tablet(s) Oral daily    MEDICATIONS  (PRN): Patient seen and examined at bedside, no acute overnight events. No acute complaints. Patient endorses good fetal movement. Patient is ambulating and tolerating regular diet. Denies CP, SOB, N/V, fevers, chills, or any other concerns.    Vital Signs Last 24 Hours  T(C): 36.7 (06-15-19 @ 01:43), Max: 36.9 (06-14-19 @ 07:11)  HR: 90 (06-15-19 @ 01:43) (64 - 105)  BP: 115/65 (06-15-19 @ 01:43) (99/60 - 120/69)  RR: 18 (06-15-19 @ 01:43) (15 - 18)  SpO2: 98% (06-15-19 @ 01:43) (96% - 100%)    I&O's Summary    13 Jun 2019 07:01  -  14 Jun 2019 07:00  --------------------------------------------------------  IN: 450 mL / OUT: 0 mL / NET: 450 mL        Physical Exam:  General: NAD  CV: RR  Lungs: breathing comfortably on RA  Abdomen: soft, gravid, non-tender  SVE: deferred  Ext: no pain or swelling    EFM: Baby A baseline 140, mod dl, +accels, -decels; Baby B baseline 140, mod dl, +accels, -decels  [place]: irreg    Labs:             12.7   8.52  )-----------( 227      ( 06-13 @ 15:17 )             38.0         MEDICATIONS  (STANDING):  ampicillin  IVPB      ampicillin  IVPB 2 Gram(s) IV Intermittent every 6 hours  folic acid 1 milliGRAM(s) Oral daily  prenatal multivitamin 1 Tablet(s) Oral daily    MEDICATIONS  (PRN):

## 2019-06-15 NOTE — CHART NOTE - NSCHARTNOTEFT_GEN_A_CORE
S:  Pt seen and examined for cervical change due to intermittent cramping and ctx on monitor.      O:   T(C): 36.9 (05:07)  HR: 68 (05:07)  BP: 102/58 (05:07)  RR: 18 (05:07)  SpO2: 98% (05:07)  SVE: 1/50/-3, previously 1/long   EFM: A-baseline 140, mod dl +accel no decel  B- baseline 135, mod dl +accel +variable decels  [place]: q7        Mg     1.6     06-13        c/w prolonged monitoring  cervix still 1 cm dilated w irregular ctx, does not appear to be in PTL    TLalPGY2

## 2019-06-16 LAB — GP B STREP GENITAL QL CULT: SIGNIFICANT CHANGE UP

## 2019-06-17 ENCOUNTER — APPOINTMENT (OUTPATIENT)
Dept: ANTEPARTUM | Facility: HOSPITAL | Age: 29
End: 2019-06-17

## 2019-06-17 ENCOUNTER — OUTPATIENT (OUTPATIENT)
Dept: OUTPATIENT SERVICES | Facility: HOSPITAL | Age: 29
LOS: 1 days | End: 2019-06-17

## 2019-06-17 ENCOUNTER — APPOINTMENT (OUTPATIENT)
Dept: ANTEPARTUM | Facility: CLINIC | Age: 29
End: 2019-06-17
Payer: MEDICAID

## 2019-06-17 ENCOUNTER — ASOB RESULT (OUTPATIENT)
Age: 29
End: 2019-06-17

## 2019-06-17 ENCOUNTER — OUTPATIENT (OUTPATIENT)
Dept: OUTPATIENT SERVICES | Facility: HOSPITAL | Age: 29
LOS: 1 days | Discharge: HOME | End: 2019-06-17
Payer: MEDICAID

## 2019-06-17 VITALS — DIASTOLIC BLOOD PRESSURE: 78 MMHG | SYSTOLIC BLOOD PRESSURE: 110 MMHG | HEART RATE: 84 BPM

## 2019-06-17 VITALS
RESPIRATION RATE: 16 BRPM | HEART RATE: 86 BPM | SYSTOLIC BLOOD PRESSURE: 104 MMHG | TEMPERATURE: 98 F | DIASTOLIC BLOOD PRESSURE: 78 MMHG

## 2019-06-17 DIAGNOSIS — Z3A.00 WEEKS OF GESTATION OF PREGNANCY NOT SPECIFIED: ICD-10-CM

## 2019-06-17 DIAGNOSIS — O26.899 OTHER SPECIFIED PREGNANCY RELATED CONDITIONS, UNSPECIFIED TRIMESTER: ICD-10-CM

## 2019-06-17 LAB
APPEARANCE UR: SIGNIFICANT CHANGE UP
BACTERIA # UR AUTO: NEGATIVE — SIGNIFICANT CHANGE UP
BILIRUB UR-MCNC: NEGATIVE — SIGNIFICANT CHANGE UP
BLOOD UR QL VISUAL: NEGATIVE — SIGNIFICANT CHANGE UP
COLOR SPEC: YELLOW — SIGNIFICANT CHANGE UP
GLUCOSE UR-MCNC: NEGATIVE — SIGNIFICANT CHANGE UP
HYALINE CASTS # UR AUTO: NEGATIVE — SIGNIFICANT CHANGE UP
KETONES UR-MCNC: NEGATIVE — SIGNIFICANT CHANGE UP
LEUKOCYTE ESTERASE UR-ACNC: NEGATIVE — SIGNIFICANT CHANGE UP
NITRITE UR-MCNC: NEGATIVE — SIGNIFICANT CHANGE UP
PH UR: 7.5 — SIGNIFICANT CHANGE UP (ref 5–8)
PROT UR-MCNC: NEGATIVE — SIGNIFICANT CHANGE UP
RBC CASTS # UR COMP ASSIST: SIGNIFICANT CHANGE UP (ref 0–?)
SP GR SPEC: 1.01 — SIGNIFICANT CHANGE UP (ref 1–1.04)
SQUAMOUS # UR AUTO: SIGNIFICANT CHANGE UP
UROBILINOGEN FLD QL: NORMAL — SIGNIFICANT CHANGE UP
WBC UR QL: SIGNIFICANT CHANGE UP (ref 0–?)

## 2019-06-17 PROCEDURE — 59025 FETAL NON-STRESS TEST: CPT | Mod: 26

## 2019-06-17 PROCEDURE — 76821 MIDDLE CEREBRAL ARTERY ECHO: CPT

## 2019-06-17 PROCEDURE — 76820 UMBILICAL ARTERY ECHO: CPT

## 2019-06-17 PROCEDURE — 76818 FETAL BIOPHYS PROFILE W/NST: CPT | Mod: 26

## 2019-06-17 NOTE — OB PROVIDER TRIAGE NOTE - HISTORY OF PRESENT ILLNESS
28y/o  @30.4wks presents from the ATU for ctx on TOCO q3mins, pt does not feel pain or ctx, pain scale 3/10, occasionally feels tightening. Reports good fetal movement. Denies LOF and VB.  MONO-DI Twins    Allergies: Denies  Medications: PNV, ASA, Fish Oil, Folic Acid    Denies Medical and Surgical HX  Denies Psy/Etoh/Drugs/Smoke HX

## 2019-06-17 NOTE — OB PROVIDER TRIAGE NOTE - ADDITIONAL INSTRUCTIONS
Follow up at next prenatal appointment  Return for loss of fluid, decreased fetal movement or vaginal bleeding  Increase oral hydration

## 2019-06-17 NOTE — OB PROVIDER TRIAGE NOTE - NSHPPHYSICALEXAM_GEN_ALL_CORE
Assessment reveals VSS  Abdomen soft, NT, gravid  ATU ultasound today       PLAN: NST Assessment reveals VSS  Abdomen soft, NT, gravid  ATU ultasound today   VE: 1/50/-3 unchanged from 6/13  TAS: cervical length 2.4-2.6,  6/13 2.8-3.0, no funneling or dynamic changes     PLAN: NST  Cat 1 tracing x2, ctx q5-6 mins on TOCO, not felt

## 2019-06-17 NOTE — OB PROVIDER TRIAGE NOTE - NSHPLABSRESULTS_GEN_ALL_CORE
Urinalysis Basic - ( 2019 14:09 )    Color: YELLOW / Appearance: TURBID / S.015 / pH: 7.5  Gluc: NEGATIVE / Ketone: NEGATIVE  / Bili: NEGATIVE / Urobili: NORMAL   Blood: NEGATIVE / Protein: NEGATIVE / Nitrite: NEGATIVE   Leuk Esterase: NEGATIVE / RBC: 0-2 / WBC 0-2   Sq Epi: OCC / Non Sq Epi: x / Bacteria: NEGATIVE        ATU Ultrasound:  A: vtx, ant placenta, MVP-2.8cm  B: breech, ant placenta, MVP-3.7cm   Poor growth on both

## 2019-06-17 NOTE — OB PROVIDER TRIAGE NOTE - NS_OBGYNHISTORY_OBGYN_ALL_OB_FT
GYN: Denies  OB: Denies      AP course complicated by PTL  Admitted on 6/13-6/15 Beta/Mag  Mono-DI Natural TIUP

## 2019-06-17 NOTE — OB PROVIDER TRIAGE NOTE - PLAN OF CARE
D/ W Dr. Reese  D/C Home  No evidence of acute process or PTL at this time  Normal fetal testing  Follow up at next prenatal appointment  Return for loss of fluid, decreased fetal movement or vaginal bleeding  Increase oral hydration

## 2019-06-19 ENCOUNTER — OUTPATIENT (OUTPATIENT)
Dept: OUTPATIENT SERVICES | Facility: HOSPITAL | Age: 29
LOS: 1 days | End: 2019-06-19

## 2019-06-19 ENCOUNTER — APPOINTMENT (OUTPATIENT)
Dept: ANTEPARTUM | Facility: CLINIC | Age: 29
End: 2019-06-19

## 2019-06-19 ENCOUNTER — ASOB RESULT (OUTPATIENT)
Age: 29
End: 2019-06-19

## 2019-06-19 ENCOUNTER — APPOINTMENT (OUTPATIENT)
Dept: ANTEPARTUM | Facility: HOSPITAL | Age: 29
End: 2019-06-19
Payer: MEDICAID

## 2019-06-19 PROCEDURE — 76821 MIDDLE CEREBRAL ARTERY ECHO: CPT

## 2019-06-19 PROCEDURE — 76818 FETAL BIOPHYS PROFILE W/NST: CPT | Mod: 26,59

## 2019-06-19 PROCEDURE — 76820 UMBILICAL ARTERY ECHO: CPT | Mod: 59

## 2019-06-21 ENCOUNTER — OUTPATIENT (OUTPATIENT)
Dept: OUTPATIENT SERVICES | Facility: HOSPITAL | Age: 29
LOS: 1 days | End: 2019-06-21

## 2019-06-21 ENCOUNTER — APPOINTMENT (OUTPATIENT)
Dept: ANTEPARTUM | Facility: HOSPITAL | Age: 29
End: 2019-06-21

## 2019-06-21 ENCOUNTER — APPOINTMENT (OUTPATIENT)
Dept: ANTEPARTUM | Facility: CLINIC | Age: 29
End: 2019-06-21
Payer: MEDICAID

## 2019-06-21 ENCOUNTER — ASOB RESULT (OUTPATIENT)
Age: 29
End: 2019-06-21

## 2019-06-21 DIAGNOSIS — O36.5921 MATERNAL CARE FOR OTHER KNOWN OR SUSPECTED POOR FETAL GROWTH, SECOND TRIMESTER, FETUS 1: ICD-10-CM

## 2019-06-21 DIAGNOSIS — O36.5922 MATERNAL CARE FOR OTHER KNOWN OR SUSPECTED POOR FETAL GROWTH, SECOND TRIMESTER, FETUS 2: ICD-10-CM

## 2019-06-21 DIAGNOSIS — Z3A.29 29 WEEKS GESTATION OF PREGNANCY: ICD-10-CM

## 2019-06-21 DIAGNOSIS — O30.032 TWIN PREGNANCY, MONOCHORIONIC/DIAMNIOTIC, SECOND TRIMESTER: ICD-10-CM

## 2019-06-21 PROCEDURE — 59025 FETAL NON-STRESS TEST: CPT | Mod: 26

## 2019-06-24 ENCOUNTER — ASOB RESULT (OUTPATIENT)
Age: 29
End: 2019-06-24

## 2019-06-24 ENCOUNTER — APPOINTMENT (OUTPATIENT)
Dept: ANTEPARTUM | Facility: HOSPITAL | Age: 29
End: 2019-06-24
Payer: MEDICAID

## 2019-06-24 ENCOUNTER — OUTPATIENT (OUTPATIENT)
Dept: OUTPATIENT SERVICES | Facility: HOSPITAL | Age: 29
LOS: 1 days | End: 2019-06-24

## 2019-06-24 ENCOUNTER — APPOINTMENT (OUTPATIENT)
Dept: ANTEPARTUM | Facility: CLINIC | Age: 29
End: 2019-06-24
Payer: MEDICAID

## 2019-06-24 DIAGNOSIS — O36.5932 MATERNAL CARE FOR OTHER KNOWN OR SUSPECTED POOR FETAL GROWTH, THIRD TRIMESTER, FETUS 2: ICD-10-CM

## 2019-06-24 DIAGNOSIS — Z3A.30 30 WEEKS GESTATION OF PREGNANCY: ICD-10-CM

## 2019-06-24 DIAGNOSIS — O36.5931 MATERNAL CARE FOR OTHER KNOWN OR SUSPECTED POOR FETAL GROWTH, THIRD TRIMESTER, FETUS 1: ICD-10-CM

## 2019-06-24 DIAGNOSIS — O30.032 TWIN PREGNANCY, MONOCHORIONIC/DIAMNIOTIC, SECOND TRIMESTER: ICD-10-CM

## 2019-06-24 PROCEDURE — 76818 FETAL BIOPHYS PROFILE W/NST: CPT | Mod: 26,59

## 2019-06-24 PROCEDURE — 76820 UMBILICAL ARTERY ECHO: CPT

## 2019-06-24 PROCEDURE — 76821 MIDDLE CEREBRAL ARTERY ECHO: CPT

## 2019-06-25 ENCOUNTER — OUTPATIENT (OUTPATIENT)
Dept: OUTPATIENT SERVICES | Facility: HOSPITAL | Age: 29
LOS: 1 days | End: 2019-06-25

## 2019-06-25 ENCOUNTER — ASOB RESULT (OUTPATIENT)
Age: 29
End: 2019-06-25

## 2019-06-25 ENCOUNTER — APPOINTMENT (OUTPATIENT)
Dept: ANTEPARTUM | Facility: HOSPITAL | Age: 29
End: 2019-06-25
Payer: MEDICAID

## 2019-06-25 PROCEDURE — 59025 FETAL NON-STRESS TEST: CPT | Mod: 26,59

## 2019-06-26 ENCOUNTER — APPOINTMENT (OUTPATIENT)
Dept: ANTEPARTUM | Facility: HOSPITAL | Age: 29
End: 2019-06-26
Payer: MEDICAID

## 2019-06-26 ENCOUNTER — OUTPATIENT (OUTPATIENT)
Dept: OUTPATIENT SERVICES | Facility: HOSPITAL | Age: 29
LOS: 1 days | End: 2019-06-26

## 2019-06-26 ENCOUNTER — ASOB RESULT (OUTPATIENT)
Age: 29
End: 2019-06-26

## 2019-06-26 DIAGNOSIS — O36.5922 MATERNAL CARE FOR OTHER KNOWN OR SUSPECTED POOR FETAL GROWTH, SECOND TRIMESTER, FETUS 2: ICD-10-CM

## 2019-06-26 DIAGNOSIS — Z3A.30 30 WEEKS GESTATION OF PREGNANCY: ICD-10-CM

## 2019-06-26 DIAGNOSIS — O36.5921 MATERNAL CARE FOR OTHER KNOWN OR SUSPECTED POOR FETAL GROWTH, SECOND TRIMESTER, FETUS 1: ICD-10-CM

## 2019-06-26 DIAGNOSIS — O30.032 TWIN PREGNANCY, MONOCHORIONIC/DIAMNIOTIC, SECOND TRIMESTER: ICD-10-CM

## 2019-06-26 PROCEDURE — 59025 FETAL NON-STRESS TEST: CPT | Mod: 26

## 2019-06-28 ENCOUNTER — APPOINTMENT (OUTPATIENT)
Dept: ANTEPARTUM | Facility: HOSPITAL | Age: 29
End: 2019-06-28

## 2019-06-28 ENCOUNTER — ASOB RESULT (OUTPATIENT)
Age: 29
End: 2019-06-28

## 2019-06-28 ENCOUNTER — APPOINTMENT (OUTPATIENT)
Dept: ANTEPARTUM | Facility: CLINIC | Age: 29
End: 2019-06-28
Payer: MEDICAID

## 2019-06-28 ENCOUNTER — OUTPATIENT (OUTPATIENT)
Dept: OUTPATIENT SERVICES | Facility: HOSPITAL | Age: 29
LOS: 1 days | End: 2019-06-28

## 2019-06-28 DIAGNOSIS — O36.5921 MATERNAL CARE FOR OTHER KNOWN OR SUSPECTED POOR FETAL GROWTH, SECOND TRIMESTER, FETUS 1: ICD-10-CM

## 2019-06-28 DIAGNOSIS — O30.032 TWIN PREGNANCY, MONOCHORIONIC/DIAMNIOTIC, SECOND TRIMESTER: ICD-10-CM

## 2019-06-28 DIAGNOSIS — O36.5922 MATERNAL CARE FOR OTHER KNOWN OR SUSPECTED POOR FETAL GROWTH, SECOND TRIMESTER, FETUS 2: ICD-10-CM

## 2019-06-28 DIAGNOSIS — Z3A.31 31 WEEKS GESTATION OF PREGNANCY: ICD-10-CM

## 2019-06-28 PROCEDURE — 76818 FETAL BIOPHYS PROFILE W/NST: CPT | Mod: 26

## 2019-06-28 PROCEDURE — 76820 UMBILICAL ARTERY ECHO: CPT | Mod: 59

## 2019-06-28 PROCEDURE — 76816 OB US FOLLOW-UP PER FETUS: CPT

## 2019-07-01 ENCOUNTER — OUTPATIENT (OUTPATIENT)
Dept: OUTPATIENT SERVICES | Facility: HOSPITAL | Age: 29
LOS: 1 days | End: 2019-07-01

## 2019-07-01 ENCOUNTER — APPOINTMENT (OUTPATIENT)
Dept: ANTEPARTUM | Facility: HOSPITAL | Age: 29
End: 2019-07-01

## 2019-07-01 ENCOUNTER — ASOB RESULT (OUTPATIENT)
Age: 29
End: 2019-07-01

## 2019-07-01 ENCOUNTER — INPATIENT (INPATIENT)
Facility: HOSPITAL | Age: 29
LOS: 14 days | Discharge: HOME | End: 2019-07-16
Attending: SPECIALIST | Admitting: SPECIALIST
Payer: MEDICAID

## 2019-07-01 ENCOUNTER — APPOINTMENT (OUTPATIENT)
Dept: ANTEPARTUM | Facility: CLINIC | Age: 29
End: 2019-07-01
Payer: MEDICAID

## 2019-07-01 VITALS
DIASTOLIC BLOOD PRESSURE: 77 MMHG | WEIGHT: 117.95 LBS | TEMPERATURE: 98 F | HEART RATE: 88 BPM | OXYGEN SATURATION: 99 % | SYSTOLIC BLOOD PRESSURE: 119 MMHG | RESPIRATION RATE: 18 BRPM | HEIGHT: 62 IN

## 2019-07-01 DIAGNOSIS — O36.5990 MATERNAL CARE FOR OTHER KNOWN OR SUSPECTED POOR FETAL GROWTH, UNSPECIFIED TRIMESTER, NOT APPLICABLE OR UNSPECIFIED: ICD-10-CM

## 2019-07-01 DIAGNOSIS — O30.032 TWIN PREGNANCY, MONOCHORIONIC/DIAMNIOTIC, SECOND TRIMESTER: ICD-10-CM

## 2019-07-01 DIAGNOSIS — O30.003 TWIN PREGNANCY, UNSPECIFIED NUMBER OF PLACENTA AND UNSPECIFIED NUMBER OF AMNIOTIC SACS, THIRD TRIMESTER: ICD-10-CM

## 2019-07-01 LAB
ALBUMIN SERPL ELPH-MCNC: 3.5 G/DL — SIGNIFICANT CHANGE UP (ref 3.3–5)
ALP SERPL-CCNC: 109 U/L — SIGNIFICANT CHANGE UP (ref 40–120)
ALT FLD-CCNC: 8 U/L — SIGNIFICANT CHANGE UP (ref 4–33)
ANION GAP SERPL CALC-SCNC: 12 MMO/L — SIGNIFICANT CHANGE UP (ref 7–14)
APTT BLD: 24 SEC — LOW (ref 27.5–36.3)
AST SERPL-CCNC: 13 U/L — SIGNIFICANT CHANGE UP (ref 4–32)
BASOPHILS # BLD AUTO: 0.02 K/UL — SIGNIFICANT CHANGE UP (ref 0–0.2)
BASOPHILS NFR BLD AUTO: 0.2 % — SIGNIFICANT CHANGE UP (ref 0–2)
BILIRUB SERPL-MCNC: < 0.2 MG/DL — LOW (ref 0.2–1.2)
BLD GP AB SCN SERPL QL: NEGATIVE — SIGNIFICANT CHANGE UP
BUN SERPL-MCNC: 10 MG/DL — SIGNIFICANT CHANGE UP (ref 7–23)
CALCIUM SERPL-MCNC: 9.6 MG/DL — SIGNIFICANT CHANGE UP (ref 8.4–10.5)
CHLORIDE SERPL-SCNC: 105 MMOL/L — SIGNIFICANT CHANGE UP (ref 98–107)
CO2 SERPL-SCNC: 21 MMOL/L — LOW (ref 22–31)
CREAT SERPL-MCNC: 0.52 MG/DL — SIGNIFICANT CHANGE UP (ref 0.5–1.3)
EOSINOPHIL # BLD AUTO: 0.27 K/UL — SIGNIFICANT CHANGE UP (ref 0–0.5)
EOSINOPHIL NFR BLD AUTO: 3.2 % — SIGNIFICANT CHANGE UP (ref 0–6)
FIBRINOGEN PPP-MCNC: 587.3 MG/DL — HIGH (ref 350–510)
GLUCOSE SERPL-MCNC: 112 MG/DL — HIGH (ref 70–99)
HBV SURFACE AG SER-ACNC: NEGATIVE — SIGNIFICANT CHANGE UP
HCT VFR BLD CALC: 35.7 % — SIGNIFICANT CHANGE UP (ref 34.5–45)
HGB BLD-MCNC: 12 G/DL — SIGNIFICANT CHANGE UP (ref 11.5–15.5)
HIV COMBO RESULT: SIGNIFICANT CHANGE UP
HIV1+2 AB SPEC QL: SIGNIFICANT CHANGE UP
IMM GRANULOCYTES NFR BLD AUTO: 0.9 % — SIGNIFICANT CHANGE UP (ref 0–1.5)
LYMPHOCYTES # BLD AUTO: 1.42 K/UL — SIGNIFICANT CHANGE UP (ref 1–3.3)
LYMPHOCYTES # BLD AUTO: 16.7 % — SIGNIFICANT CHANGE UP (ref 13–44)
MCHC RBC-ENTMCNC: 30 PG — SIGNIFICANT CHANGE UP (ref 27–34)
MCHC RBC-ENTMCNC: 33.6 % — SIGNIFICANT CHANGE UP (ref 32–36)
MCV RBC AUTO: 89.3 FL — SIGNIFICANT CHANGE UP (ref 80–100)
MONOCYTES # BLD AUTO: 0.6 K/UL — SIGNIFICANT CHANGE UP (ref 0–0.9)
MONOCYTES NFR BLD AUTO: 7.1 % — SIGNIFICANT CHANGE UP (ref 2–14)
NEUTROPHILS # BLD AUTO: 6.09 K/UL — SIGNIFICANT CHANGE UP (ref 1.8–7.4)
NEUTROPHILS NFR BLD AUTO: 71.9 % — SIGNIFICANT CHANGE UP (ref 43–77)
NRBC # FLD: 0 K/UL — SIGNIFICANT CHANGE UP (ref 0–0)
PLATELET # BLD AUTO: 210 K/UL — SIGNIFICANT CHANGE UP (ref 150–400)
PMV BLD: 10.7 FL — SIGNIFICANT CHANGE UP (ref 7–13)
POTASSIUM SERPL-MCNC: 3.8 MMOL/L — SIGNIFICANT CHANGE UP (ref 3.5–5.3)
POTASSIUM SERPL-SCNC: 3.8 MMOL/L — SIGNIFICANT CHANGE UP (ref 3.5–5.3)
PROT SERPL-MCNC: 5.9 G/DL — LOW (ref 6–8.3)
RBC # BLD: 4 M/UL — SIGNIFICANT CHANGE UP (ref 3.8–5.2)
RBC # FLD: 12.7 % — SIGNIFICANT CHANGE UP (ref 10.3–14.5)
RH IG SCN BLD-IMP: POSITIVE — SIGNIFICANT CHANGE UP
SODIUM SERPL-SCNC: 138 MMOL/L — SIGNIFICANT CHANGE UP (ref 135–145)
URATE SERPL-MCNC: 4.3 MG/DL — SIGNIFICANT CHANGE UP (ref 2.5–7)
WBC # BLD: 8.48 K/UL — SIGNIFICANT CHANGE UP (ref 3.8–10.5)
WBC # FLD AUTO: 8.48 K/UL — SIGNIFICANT CHANGE UP (ref 3.8–10.5)

## 2019-07-01 PROCEDURE — 76820 UMBILICAL ARTERY ECHO: CPT

## 2019-07-01 PROCEDURE — 76818 FETAL BIOPHYS PROFILE W/NST: CPT | Mod: 26,59

## 2019-07-01 NOTE — OB PROVIDER H&P - PROBLEM SELECTOR PLAN 1
maternal wellbeing  - regular diet  - ASA and PNV  - encourage ambulation, SCDs while in bed  - admission labs  - SLIV     fetal wellbeing  -1hr NST BID  -ATU sono 2x/week  - plan for delivery at 34 weeks    TLal PGY3  d/w Dr Hook

## 2019-07-01 NOTE — OB PROVIDER H&P - NSHPPHYSICALEXAM_GEN_ALL_CORE
PE:     Vital Signs Last 24 Hrs  T(C): 36.8 (01 Jul 2019 17:38), Max: 36.8 (01 Jul 2019 17:38)  T(F): 98.2 (01 Jul 2019 17:38), Max: 98.2 (01 Jul 2019 17:38)  HR: 88 (01 Jul 2019 17:38) (88 - 88)  BP: 119/77 (01 Jul 2019 17:38) (119/77 - 119/77)  BP(mean): --  RR: 18 (01 Jul 2019 17:38) (18 - 18)  SpO2: 99% (01 Jul 2019 17:38) (99% - 99%)    SVE: 1/60/-3  FHT: A- baseline 150 mod dl +accel no decel  B- baseline 140 mod dl+accel no decel  [place]: irreg, ctx q2-3 min - pt reports not feeling majority of ctx

## 2019-07-01 NOTE — OB PROVIDER H&P - HISTORY OF PRESENT ILLNESS
28 yo  @ 32+3 presents for close inpatient monitoring due to mono-di TIUP c/b AEDV of baby B and IUGR. Pt was previously admitted on  due to r/o PTL and at that time received BMZx2 (), Mg and Ampicillin.  Due to unchanged cervical exam pt was sent home with close monitoring in ATU 3x/week. reports +FM denies LOF/VB. overall feels well.     Today, ATU sono(): A- 8/8 vtx, doppler wnl, ant placenta, 1512g/4th%ile  B-  transverse, IUGR, intermittent AEDV, 1082 grams/0%ile, ant placent  28% discordance     PMSH: denies  Meds: ASA, PNV  All: NKDA    PE:   SVE: /-3  FHT: A- baseline 150 mod dl +accel no decel  B- baseline 140 mod dl+accel no decel  [place]: irreg, ctx q2-3 min

## 2019-07-01 NOTE — OB PROVIDER H&P - ASSESSMENT
28 yo  @ 32+3 with mono-di TIUP presents for close inpatient monitoring due to mono-di TIUP c/b AEDV of baby B and IUGR

## 2019-07-02 ENCOUNTER — ASOB RESULT (OUTPATIENT)
Age: 29
End: 2019-07-02

## 2019-07-02 ENCOUNTER — APPOINTMENT (OUTPATIENT)
Dept: ANTEPARTUM | Facility: HOSPITAL | Age: 29
End: 2019-07-02
Payer: MEDICAID

## 2019-07-02 ENCOUNTER — OUTPATIENT (OUTPATIENT)
Dept: OUTPATIENT SERVICES | Facility: HOSPITAL | Age: 29
LOS: 1 days | End: 2019-07-02

## 2019-07-02 LAB
APPEARANCE UR: CLEAR — SIGNIFICANT CHANGE UP
BILIRUB UR-MCNC: NEGATIVE — SIGNIFICANT CHANGE UP
BLOOD UR QL VISUAL: NEGATIVE — SIGNIFICANT CHANGE UP
COLOR SPEC: SIGNIFICANT CHANGE UP
GLUCOSE UR-MCNC: NEGATIVE — SIGNIFICANT CHANGE UP
KETONES UR-MCNC: NEGATIVE — SIGNIFICANT CHANGE UP
LEUKOCYTE ESTERASE UR-ACNC: NEGATIVE — SIGNIFICANT CHANGE UP
NITRITE UR-MCNC: NEGATIVE — SIGNIFICANT CHANGE UP
PH UR: 7 — SIGNIFICANT CHANGE UP (ref 5–8)
PROT UR-MCNC: NEGATIVE — SIGNIFICANT CHANGE UP
SP GR SPEC: 1.02 — SIGNIFICANT CHANGE UP (ref 1–1.04)
T PALLIDUM AB TITR SER: NEGATIVE — SIGNIFICANT CHANGE UP
UROBILINOGEN FLD QL: NORMAL — SIGNIFICANT CHANGE UP

## 2019-07-02 PROCEDURE — 76821 MIDDLE CEREBRAL ARTERY ECHO: CPT | Mod: 26

## 2019-07-02 PROCEDURE — 76819 FETAL BIOPHYS PROFIL W/O NST: CPT | Mod: 26

## 2019-07-02 PROCEDURE — 76820 UMBILICAL ARTERY ECHO: CPT | Mod: 26

## 2019-07-03 ENCOUNTER — APPOINTMENT (OUTPATIENT)
Dept: ANTEPARTUM | Facility: HOSPITAL | Age: 29
End: 2019-07-03

## 2019-07-03 DIAGNOSIS — O36.5921 MATERNAL CARE FOR OTHER KNOWN OR SUSPECTED POOR FETAL GROWTH, SECOND TRIMESTER, FETUS 1: ICD-10-CM

## 2019-07-03 DIAGNOSIS — O36.5931 MATERNAL CARE FOR OTHER KNOWN OR SUSPECTED POOR FETAL GROWTH, THIRD TRIMESTER, FETUS 1: ICD-10-CM

## 2019-07-03 DIAGNOSIS — Z3A.31 31 WEEKS GESTATION OF PREGNANCY: ICD-10-CM

## 2019-07-03 DIAGNOSIS — O30.032 TWIN PREGNANCY, MONOCHORIONIC/DIAMNIOTIC, SECOND TRIMESTER: ICD-10-CM

## 2019-07-03 DIAGNOSIS — O30.033 TWIN PREGNANCY, MONOCHORIONIC/DIAMNIOTIC, THIRD TRIMESTER: ICD-10-CM

## 2019-07-03 DIAGNOSIS — O36.5932 MATERNAL CARE FOR OTHER KNOWN OR SUSPECTED POOR FETAL GROWTH, THIRD TRIMESTER, FETUS 2: ICD-10-CM

## 2019-07-03 DIAGNOSIS — O36.5922 MATERNAL CARE FOR OTHER KNOWN OR SUSPECTED POOR FETAL GROWTH, SECOND TRIMESTER, FETUS 2: ICD-10-CM

## 2019-07-03 RX ORDER — HEPARIN SODIUM 5000 [USP'U]/ML
5000 INJECTION INTRAVENOUS; SUBCUTANEOUS EVERY 12 HOURS
Refills: 0 | Status: DISCONTINUED | OUTPATIENT
Start: 2019-07-03 | End: 2019-07-11

## 2019-07-03 RX ADMIN — HEPARIN SODIUM 5000 UNIT(S): 5000 INJECTION INTRAVENOUS; SUBCUTANEOUS at 11:36

## 2019-07-03 RX ADMIN — HEPARIN SODIUM 5000 UNIT(S): 5000 INJECTION INTRAVENOUS; SUBCUTANEOUS at 22:05

## 2019-07-03 NOTE — CHART NOTE - NSCHARTNOTEFT_GEN_A_CORE
Met with Ms. Mancia and her  to discuss what would happen if their twins were to be born soon due to concerns for growth and AEDV.    1) NICU team will be present at delivery and stabilize infants. Some infants will need CPAP, and intubation for surfactant is a possibility.  2) The importance of breastmilk and pumping was discussed. Lactation nurses will be available.  3) Phototherapy is commonly required for jaundice in premature infants. Bilirubins will be serially monitored.  4) Infants may be screened and rescreened for infection as clinical status warrants and antibiotics may be necessary.  5) Infants will require early IV access for fluids, and may require several days of TPN as feeding advances.  6) Infants will be monitored for milestones by their PMD, NICU clinic and neuro developmental clinic during first two years of life.    Please notify us of any changes in her clinical status or if she has further questions.

## 2019-07-04 RX ADMIN — HEPARIN SODIUM 5000 UNIT(S): 5000 INJECTION INTRAVENOUS; SUBCUTANEOUS at 10:36

## 2019-07-04 RX ADMIN — HEPARIN SODIUM 5000 UNIT(S): 5000 INJECTION INTRAVENOUS; SUBCUTANEOUS at 22:17

## 2019-07-05 ENCOUNTER — APPOINTMENT (OUTPATIENT)
Dept: ANTEPARTUM | Facility: HOSPITAL | Age: 29
End: 2019-07-05
Payer: MEDICAID

## 2019-07-05 ENCOUNTER — ASOB RESULT (OUTPATIENT)
Age: 29
End: 2019-07-05

## 2019-07-05 ENCOUNTER — APPOINTMENT (OUTPATIENT)
Dept: ANTEPARTUM | Facility: HOSPITAL | Age: 29
End: 2019-07-05

## 2019-07-05 ENCOUNTER — APPOINTMENT (OUTPATIENT)
Dept: ANTEPARTUM | Facility: CLINIC | Age: 29
End: 2019-07-05

## 2019-07-05 LAB
BLD GP AB SCN SERPL QL: NEGATIVE — SIGNIFICANT CHANGE UP
RH IG SCN BLD-IMP: POSITIVE — SIGNIFICANT CHANGE UP

## 2019-07-05 PROCEDURE — 76819 FETAL BIOPHYS PROFIL W/O NST: CPT | Mod: 26

## 2019-07-05 PROCEDURE — 76821 MIDDLE CEREBRAL ARTERY ECHO: CPT | Mod: 26,59

## 2019-07-05 PROCEDURE — 76820 UMBILICAL ARTERY ECHO: CPT | Mod: 26

## 2019-07-05 RX ADMIN — HEPARIN SODIUM 5000 UNIT(S): 5000 INJECTION INTRAVENOUS; SUBCUTANEOUS at 22:21

## 2019-07-05 RX ADMIN — HEPARIN SODIUM 5000 UNIT(S): 5000 INJECTION INTRAVENOUS; SUBCUTANEOUS at 11:05

## 2019-07-06 PROCEDURE — 99231 SBSQ HOSP IP/OBS SF/LOW 25: CPT

## 2019-07-06 RX ADMIN — HEPARIN SODIUM 5000 UNIT(S): 5000 INJECTION INTRAVENOUS; SUBCUTANEOUS at 10:43

## 2019-07-06 RX ADMIN — HEPARIN SODIUM 5000 UNIT(S): 5000 INJECTION INTRAVENOUS; SUBCUTANEOUS at 22:05

## 2019-07-07 RX ADMIN — HEPARIN SODIUM 5000 UNIT(S): 5000 INJECTION INTRAVENOUS; SUBCUTANEOUS at 22:22

## 2019-07-07 RX ADMIN — HEPARIN SODIUM 5000 UNIT(S): 5000 INJECTION INTRAVENOUS; SUBCUTANEOUS at 10:04

## 2019-07-08 ENCOUNTER — APPOINTMENT (OUTPATIENT)
Dept: ANTEPARTUM | Facility: HOSPITAL | Age: 29
End: 2019-07-08

## 2019-07-08 ENCOUNTER — APPOINTMENT (OUTPATIENT)
Dept: ANTEPARTUM | Facility: CLINIC | Age: 29
End: 2019-07-08

## 2019-07-08 RX ADMIN — HEPARIN SODIUM 5000 UNIT(S): 5000 INJECTION INTRAVENOUS; SUBCUTANEOUS at 21:55

## 2019-07-08 RX ADMIN — HEPARIN SODIUM 5000 UNIT(S): 5000 INJECTION INTRAVENOUS; SUBCUTANEOUS at 10:15

## 2019-07-09 ENCOUNTER — ASOB RESULT (OUTPATIENT)
Age: 29
End: 2019-07-09

## 2019-07-09 ENCOUNTER — APPOINTMENT (OUTPATIENT)
Dept: ANTEPARTUM | Facility: HOSPITAL | Age: 29
End: 2019-07-09
Payer: MEDICAID

## 2019-07-09 PROCEDURE — 76819 FETAL BIOPHYS PROFIL W/O NST: CPT | Mod: 26

## 2019-07-09 PROCEDURE — 76820 UMBILICAL ARTERY ECHO: CPT | Mod: 26,59

## 2019-07-09 PROCEDURE — 76821 MIDDLE CEREBRAL ARTERY ECHO: CPT | Mod: 26,59

## 2019-07-09 RX ADMIN — HEPARIN SODIUM 5000 UNIT(S): 5000 INJECTION INTRAVENOUS; SUBCUTANEOUS at 09:42

## 2019-07-09 RX ADMIN — HEPARIN SODIUM 5000 UNIT(S): 5000 INJECTION INTRAVENOUS; SUBCUTANEOUS at 21:41

## 2019-07-09 NOTE — DIETITIAN INITIAL EVALUATION ADULT. - ENERGY INTAKE
Patient reports good appetite. Patient eating meals in-house and also receiving food from outside hospital Tolerating diet. Good (>75%)

## 2019-07-09 NOTE — DIETITIAN INITIAL EVALUATION ADULT. - ENERGY NEEDS
*Energy needs +250Kcal/day for multiple gestation   Height (cm): 157.48 (01 Jul 2019 17:38)  Weight (kg): 52.2 (08 Jul 2019 17:56)  BMI (kg/m2): 21 (08 Jul 2019 17:56)  IBW: 47.6kg +/-10% *IBW used to calculate protein needs.

## 2019-07-09 NOTE — DIETITIAN INITIAL EVALUATION ADULT. - OTHER INFO
Per chart review patient 29 year old female  @ 33w4d with mono-di TIUP presents for close inpatient monitoring due to mono-di TIUP c/b AEDV of baby B and IUGR. Patient reports good appetite/PO intake prior to admission which continues in-house. Patient notes that she was having a "stomach ache" each morning and her MD stopped her prenatal multivitamin to alleviate stomach discomfort. No GI distress (nausea/vomiting/diarrhea/constipation) noted at this time. NKFA. Patient with no questions/concerns at this time.

## 2019-07-09 NOTE — DIETITIAN INITIAL EVALUATION ADULT. - RD TO REMAIN AVAILABLE
1. Continue Regular diet. 2. Please Encourage po intake, assist with meals and menu selections, provide alternatives PRN.

## 2019-07-10 ENCOUNTER — APPOINTMENT (OUTPATIENT)
Dept: ANTEPARTUM | Facility: HOSPITAL | Age: 29
End: 2019-07-10

## 2019-07-10 DIAGNOSIS — O36.5932 MATERNAL CARE FOR OTHER KNOWN OR SUSPECTED POOR FETAL GROWTH, THIRD TRIMESTER, FETUS 2: ICD-10-CM

## 2019-07-10 DIAGNOSIS — Z3A.32 32 WEEKS GESTATION OF PREGNANCY: ICD-10-CM

## 2019-07-10 DIAGNOSIS — O36.5931 MATERNAL CARE FOR OTHER KNOWN OR SUSPECTED POOR FETAL GROWTH, THIRD TRIMESTER, FETUS 1: ICD-10-CM

## 2019-07-10 DIAGNOSIS — O30.033 TWIN PREGNANCY, MONOCHORIONIC/DIAMNIOTIC, THIRD TRIMESTER: ICD-10-CM

## 2019-07-10 RX ADMIN — HEPARIN SODIUM 5000 UNIT(S): 5000 INJECTION INTRAVENOUS; SUBCUTANEOUS at 21:45

## 2019-07-10 RX ADMIN — HEPARIN SODIUM 5000 UNIT(S): 5000 INJECTION INTRAVENOUS; SUBCUTANEOUS at 10:39

## 2019-07-11 ENCOUNTER — APPOINTMENT (OUTPATIENT)
Dept: ANTEPARTUM | Facility: HOSPITAL | Age: 29
End: 2019-07-11
Payer: MEDICAID

## 2019-07-11 ENCOUNTER — ASOB RESULT (OUTPATIENT)
Age: 29
End: 2019-07-11

## 2019-07-11 ENCOUNTER — TRANSCRIPTION ENCOUNTER (OUTPATIENT)
Age: 29
End: 2019-07-11

## 2019-07-11 PROCEDURE — 76828 ECHO EXAM OF FETAL HEART: CPT | Mod: 26

## 2019-07-11 PROCEDURE — 76821 MIDDLE CEREBRAL ARTERY ECHO: CPT | Mod: 26

## 2019-07-11 PROCEDURE — 76816 OB US FOLLOW-UP PER FETUS: CPT | Mod: 26,59

## 2019-07-11 PROCEDURE — 76819 FETAL BIOPHYS PROFIL W/O NST: CPT | Mod: 26

## 2019-07-11 PROCEDURE — 76820 UMBILICAL ARTERY ECHO: CPT | Mod: 26,59

## 2019-07-11 RX ORDER — SODIUM CHLORIDE 9 MG/ML
1000 INJECTION, SOLUTION INTRAVENOUS
Refills: 0 | Status: DISCONTINUED | OUTPATIENT
Start: 2019-07-11 | End: 2019-07-12

## 2019-07-11 RX ADMIN — HEPARIN SODIUM 5000 UNIT(S): 5000 INJECTION INTRAVENOUS; SUBCUTANEOUS at 10:44

## 2019-07-12 ENCOUNTER — TRANSCRIPTION ENCOUNTER (OUTPATIENT)
Age: 29
End: 2019-07-12

## 2019-07-12 ENCOUNTER — APPOINTMENT (OUTPATIENT)
Dept: ANTEPARTUM | Facility: HOSPITAL | Age: 29
End: 2019-07-12

## 2019-07-12 ENCOUNTER — APPOINTMENT (OUTPATIENT)
Dept: ANTEPARTUM | Facility: CLINIC | Age: 29
End: 2019-07-12

## 2019-07-12 ENCOUNTER — RESULT REVIEW (OUTPATIENT)
Age: 29
End: 2019-07-12

## 2019-07-12 DIAGNOSIS — O36.5931 MATERNAL CARE FOR OTHER KNOWN OR SUSPECTED POOR FETAL GROWTH, THIRD TRIMESTER, FETUS 1: ICD-10-CM

## 2019-07-12 DIAGNOSIS — O36.5932 MATERNAL CARE FOR OTHER KNOWN OR SUSPECTED POOR FETAL GROWTH, THIRD TRIMESTER, FETUS 2: ICD-10-CM

## 2019-07-12 DIAGNOSIS — O30.033 TWIN PREGNANCY, MONOCHORIONIC/DIAMNIOTIC, THIRD TRIMESTER: ICD-10-CM

## 2019-07-12 DIAGNOSIS — Z3A.32 32 WEEKS GESTATION OF PREGNANCY: ICD-10-CM

## 2019-07-12 LAB
APTT BLD: 25.4 SEC — LOW (ref 27.5–36.3)
BASOPHILS # BLD AUTO: 0.05 K/UL — SIGNIFICANT CHANGE UP (ref 0–0.2)
BASOPHILS NFR BLD AUTO: 0.5 % — SIGNIFICANT CHANGE UP (ref 0–2)
BLD GP AB SCN SERPL QL: NEGATIVE — SIGNIFICANT CHANGE UP
EOSINOPHIL # BLD AUTO: 0.34 K/UL — SIGNIFICANT CHANGE UP (ref 0–0.5)
EOSINOPHIL NFR BLD AUTO: 3.6 % — SIGNIFICANT CHANGE UP (ref 0–6)
HCT VFR BLD CALC: 37.4 % — SIGNIFICANT CHANGE UP (ref 34.5–45)
HGB BLD-MCNC: 12.6 G/DL — SIGNIFICANT CHANGE UP (ref 11.5–15.5)
IMM GRANULOCYTES NFR BLD AUTO: 2 % — HIGH (ref 0–1.5)
INR BLD: 0.9 — SIGNIFICANT CHANGE UP (ref 0.88–1.17)
LYMPHOCYTES # BLD AUTO: 1.6 K/UL — SIGNIFICANT CHANGE UP (ref 1–3.3)
LYMPHOCYTES # BLD AUTO: 16.8 % — SIGNIFICANT CHANGE UP (ref 13–44)
MCHC RBC-ENTMCNC: 30.1 PG — SIGNIFICANT CHANGE UP (ref 27–34)
MCHC RBC-ENTMCNC: 33.7 % — SIGNIFICANT CHANGE UP (ref 32–36)
MCV RBC AUTO: 89.3 FL — SIGNIFICANT CHANGE UP (ref 80–100)
MONOCYTES # BLD AUTO: 0.68 K/UL — SIGNIFICANT CHANGE UP (ref 0–0.9)
MONOCYTES NFR BLD AUTO: 7.1 % — SIGNIFICANT CHANGE UP (ref 2–14)
NEUTROPHILS # BLD AUTO: 6.68 K/UL — SIGNIFICANT CHANGE UP (ref 1.8–7.4)
NEUTROPHILS NFR BLD AUTO: 70 % — SIGNIFICANT CHANGE UP (ref 43–77)
NRBC # FLD: 0 K/UL — SIGNIFICANT CHANGE UP (ref 0–0)
PLATELET # BLD AUTO: 184 K/UL — SIGNIFICANT CHANGE UP (ref 150–400)
PMV BLD: 10.8 FL — SIGNIFICANT CHANGE UP (ref 7–13)
PROTHROM AB SERPL-ACNC: 10 SEC — SIGNIFICANT CHANGE UP (ref 9.8–13.1)
RBC # BLD: 4.19 M/UL — SIGNIFICANT CHANGE UP (ref 3.8–5.2)
RBC # FLD: 12.9 % — SIGNIFICANT CHANGE UP (ref 10.3–14.5)
RH IG SCN BLD-IMP: POSITIVE — SIGNIFICANT CHANGE UP
WBC # BLD: 9.54 K/UL — SIGNIFICANT CHANGE UP (ref 3.8–10.5)
WBC # FLD AUTO: 9.54 K/UL — SIGNIFICANT CHANGE UP (ref 3.8–10.5)

## 2019-07-12 PROCEDURE — 88307 TISSUE EXAM BY PATHOLOGIST: CPT | Mod: 26

## 2019-07-12 RX ORDER — MAGNESIUM HYDROXIDE 400 MG/1
30 TABLET, CHEWABLE ORAL
Refills: 0 | Status: DISCONTINUED | OUTPATIENT
Start: 2019-07-12 | End: 2019-07-16

## 2019-07-12 RX ORDER — MORPHINE SULFATE 50 MG/1
0.15 CAPSULE, EXTENDED RELEASE ORAL ONCE
Refills: 0 | Status: DISCONTINUED | OUTPATIENT
Start: 2019-07-12 | End: 2019-07-14

## 2019-07-12 RX ORDER — OMEGA-3 ACID ETHYL ESTERS 1 G
0 CAPSULE ORAL
Qty: 0 | Refills: 0 | DISCHARGE

## 2019-07-12 RX ORDER — OXYCODONE HYDROCHLORIDE 5 MG/1
10 TABLET ORAL
Refills: 0 | Status: DISCONTINUED | OUTPATIENT
Start: 2019-07-12 | End: 2019-07-12

## 2019-07-12 RX ORDER — FAMOTIDINE 10 MG/ML
20 INJECTION INTRAVENOUS ONCE
Refills: 0 | Status: COMPLETED | OUTPATIENT
Start: 2019-07-12 | End: 2019-07-12

## 2019-07-12 RX ORDER — OXYCODONE HYDROCHLORIDE 5 MG/1
5 TABLET ORAL
Refills: 0 | Status: DISCONTINUED | OUTPATIENT
Start: 2019-07-12 | End: 2019-07-13

## 2019-07-12 RX ORDER — HEPARIN SODIUM 5000 [USP'U]/ML
5000 INJECTION INTRAVENOUS; SUBCUTANEOUS EVERY 12 HOURS
Refills: 0 | Status: DISCONTINUED | OUTPATIENT
Start: 2019-07-12 | End: 2019-07-16

## 2019-07-12 RX ORDER — IBUPROFEN 200 MG
600 TABLET ORAL EVERY 6 HOURS
Refills: 0 | Status: COMPLETED | OUTPATIENT
Start: 2019-07-12 | End: 2020-06-09

## 2019-07-12 RX ORDER — SODIUM CHLORIDE 9 MG/ML
1000 INJECTION, SOLUTION INTRAVENOUS
Refills: 0 | Status: DISCONTINUED | OUTPATIENT
Start: 2019-07-12 | End: 2019-07-13

## 2019-07-12 RX ORDER — DOCUSATE SODIUM 100 MG
100 CAPSULE ORAL
Refills: 0 | Status: DISCONTINUED | OUTPATIENT
Start: 2019-07-12 | End: 2019-07-16

## 2019-07-12 RX ORDER — LANOLIN
1 OINTMENT (GRAM) TOPICAL EVERY 6 HOURS
Refills: 0 | Status: DISCONTINUED | OUTPATIENT
Start: 2019-07-12 | End: 2019-07-16

## 2019-07-12 RX ORDER — DIPHENHYDRAMINE HCL 50 MG
25 CAPSULE ORAL EVERY 6 HOURS
Refills: 0 | Status: DISCONTINUED | OUTPATIENT
Start: 2019-07-12 | End: 2019-07-16

## 2019-07-12 RX ORDER — IBUPROFEN 200 MG
1 TABLET ORAL
Qty: 0 | Refills: 0 | DISCHARGE

## 2019-07-12 RX ORDER — TETANUS TOXOID, REDUCED DIPHTHERIA TOXOID AND ACELLULAR PERTUSSIS VACCINE, ADSORBED 5; 2.5; 8; 8; 2.5 [IU]/.5ML; [IU]/.5ML; UG/.5ML; UG/.5ML; UG/.5ML
0.5 SUSPENSION INTRAMUSCULAR ONCE
Refills: 0 | Status: DISCONTINUED | OUTPATIENT
Start: 2019-07-12 | End: 2019-07-16

## 2019-07-12 RX ORDER — KETOROLAC TROMETHAMINE 30 MG/ML
30 SYRINGE (ML) INJECTION EVERY 6 HOURS
Refills: 0 | Status: DISCONTINUED | OUTPATIENT
Start: 2019-07-12 | End: 2019-07-13

## 2019-07-12 RX ORDER — GLYCERIN ADULT
1 SUPPOSITORY, RECTAL RECTAL AT BEDTIME
Refills: 0 | Status: DISCONTINUED | OUTPATIENT
Start: 2019-07-12 | End: 2019-07-16

## 2019-07-12 RX ORDER — ONDANSETRON 8 MG/1
4 TABLET, FILM COATED ORAL EVERY 6 HOURS
Refills: 0 | Status: DISCONTINUED | OUTPATIENT
Start: 2019-07-12 | End: 2019-07-14

## 2019-07-12 RX ORDER — SIMETHICONE 80 MG/1
80 TABLET, CHEWABLE ORAL EVERY 4 HOURS
Refills: 0 | Status: DISCONTINUED | OUTPATIENT
Start: 2019-07-12 | End: 2019-07-16

## 2019-07-12 RX ORDER — NALOXONE HYDROCHLORIDE 4 MG/.1ML
0.1 SPRAY NASAL
Refills: 0 | Status: DISCONTINUED | OUTPATIENT
Start: 2019-07-12 | End: 2019-07-14

## 2019-07-12 RX ORDER — CITRIC ACID/SODIUM CITRATE 300-500 MG
30 SOLUTION, ORAL ORAL ONCE
Refills: 0 | Status: COMPLETED | OUTPATIENT
Start: 2019-07-12 | End: 2019-07-12

## 2019-07-12 RX ORDER — OXYCODONE HYDROCHLORIDE 5 MG/1
5 TABLET ORAL ONCE
Refills: 0 | Status: DISCONTINUED | OUTPATIENT
Start: 2019-07-12 | End: 2019-07-16

## 2019-07-12 RX ORDER — ASPIRIN/CALCIUM CARB/MAGNESIUM 324 MG
1 TABLET ORAL
Qty: 0 | Refills: 0 | DISCHARGE

## 2019-07-12 RX ORDER — OXYTOCIN 10 UNIT/ML
333.33 VIAL (ML) INJECTION
Qty: 20 | Refills: 0 | Status: DISCONTINUED | OUTPATIENT
Start: 2019-07-12 | End: 2019-07-13

## 2019-07-12 RX ORDER — ACETAMINOPHEN 500 MG
975 TABLET ORAL
Refills: 0 | Status: DISCONTINUED | OUTPATIENT
Start: 2019-07-12 | End: 2019-07-16

## 2019-07-12 RX ORDER — OXYCODONE HYDROCHLORIDE 5 MG/1
5 TABLET ORAL
Refills: 0 | Status: COMPLETED | OUTPATIENT
Start: 2019-07-12 | End: 2019-07-19

## 2019-07-12 RX ORDER — BUTORPHANOL TARTRATE 2 MG/ML
0.12 INJECTION, SOLUTION INTRAMUSCULAR; INTRAVENOUS EVERY 6 HOURS
Refills: 0 | Status: DISCONTINUED | OUTPATIENT
Start: 2019-07-12 | End: 2019-07-14

## 2019-07-12 RX ORDER — METOCLOPRAMIDE HCL 10 MG
10 TABLET ORAL ONCE
Refills: 0 | Status: COMPLETED | OUTPATIENT
Start: 2019-07-12 | End: 2019-07-12

## 2019-07-12 RX ORDER — HYDROMORPHONE HYDROCHLORIDE 2 MG/ML
1 INJECTION INTRAMUSCULAR; INTRAVENOUS; SUBCUTANEOUS
Refills: 0 | Status: DISCONTINUED | OUTPATIENT
Start: 2019-07-12 | End: 2019-07-12

## 2019-07-12 RX ADMIN — Medication 30 MILLIGRAM(S): at 19:30

## 2019-07-12 RX ADMIN — Medication 10 MILLIGRAM(S): at 09:50

## 2019-07-12 RX ADMIN — SODIUM CHLORIDE 75 MILLILITER(S): 9 INJECTION, SOLUTION INTRAVENOUS at 12:37

## 2019-07-12 RX ADMIN — Medication 1000 MILLIUNIT(S)/MIN: at 12:37

## 2019-07-12 RX ADMIN — Medication 30 MILLIGRAM(S): at 18:35

## 2019-07-12 RX ADMIN — SODIUM CHLORIDE 125 MILLILITER(S): 9 INJECTION, SOLUTION INTRAVENOUS at 00:44

## 2019-07-12 RX ADMIN — Medication 975 MILLIGRAM(S): at 23:56

## 2019-07-12 RX ADMIN — HYDROMORPHONE HYDROCHLORIDE 1 MILLIGRAM(S): 2 INJECTION INTRAMUSCULAR; INTRAVENOUS; SUBCUTANEOUS at 14:08

## 2019-07-12 RX ADMIN — FAMOTIDINE 20 MILLIGRAM(S): 10 INJECTION INTRAVENOUS at 09:50

## 2019-07-12 RX ADMIN — Medication 30 MILLILITER(S): at 09:50

## 2019-07-12 RX ADMIN — HYDROMORPHONE HYDROCHLORIDE 1 MILLIGRAM(S): 2 INJECTION INTRAMUSCULAR; INTRAVENOUS; SUBCUTANEOUS at 14:23

## 2019-07-12 RX ADMIN — HEPARIN SODIUM 5000 UNIT(S): 5000 INJECTION INTRAVENOUS; SUBCUTANEOUS at 18:35

## 2019-07-12 NOTE — OB NEONATOLOGY/PEDIATRICIAN DELIVERY SUMMARY - NSPEDSNEONOTESA_OBGYN_ALL_OB_FT
34wk mono-di twin A born to 28yo , AB+, GBS-, PNL- and immune by c/s due to severe IUGR of uncertain etiology and AEDV in twin B, with 10 days of inpatient monitoring. Did not receive betamethasone. Baby was born vigorous, and cord clamping delayed for one minute, w/d/s/s, Apgars 9/9. Small amount of clear secretions suctioned from pharynx. No further resuscitation required. Brought to NICU for further care.

## 2019-07-12 NOTE — DISCHARGE NOTE OB - CARE PLAN
Principal Discharge DX:	 delivery delivered  Goal:	home with independent care  Assessment and plan of treatment:	return 3 weeks  Secondary Diagnosis:	Twin birth  Secondary Diagnosis:	Growth retardation

## 2019-07-12 NOTE — DISCHARGE NOTE OB - MATERIALS PROVIDED
[facility] [place] Screening Program/[place]  Immunization Record/Breastfeeding Log/Tdap Vaccination (VIS Pub Date: 2012)/Guide to Postpartum Care/[facility] Hearing Screen Program/Shaken Baby Prevention Handout/Birth Certificate Instructions/Vaccinations

## 2019-07-12 NOTE — DISCHARGE NOTE OB - MEDICATION SUMMARY - MEDICATIONS TO TAKE
I will START or STAY ON the medications listed below when I get home from the hospital:    ibuprofen 600 mg oral tablet  -- 1 tab(s) by mouth every 6 hours  -- Indication: For pain    Prenatal Multivitamins with Folic Acid 1 mg oral tablet  -- 1 tab(s) by mouth once a day  -- Indication: For vitamins I will START or STAY ON the medications listed below when I get home from the hospital:    ibuprofen 600 mg oral tablet  -- 1 tab(s) by mouth every 6 hours  -- Indication: For pain, as needed    acetaminophen 325 mg oral tablet  -- 3 tab(s) by mouth   -- Indication: For pain, as needed    Prenatal Multivitamins with Folic Acid 1 mg oral tablet  -- 1 tab(s) by mouth once a day  -- Indication: For vitamins

## 2019-07-12 NOTE — DISCHARGE NOTE OB - CARE PROVIDER_API CALL
Tyshawn Valdovinos)  Obstetrics and Gynecology  [address] [id number]  Phone: [phone]  Fax: [phone]  Follow Up Time:

## 2019-07-12 NOTE — DISCHARGE NOTE OB - MEDICATION SUMMARY - MEDICATIONS TO STOP TAKING
I will STOP taking the medications listed below when I get home from the hospital:    Low Dose ASA 81 mg oral tablet  -- 1 tab(s) by mouth once a day    Fish Oil oral capsule    folic acid 1 mg oral tablet  -- 1 tab(s) by mouth once a day    blood pressure cuff  -- 1  between cheek and gums 1 to 2 times a day

## 2019-07-12 NOTE — OB PROVIDER DELIVERY SUMMARY - NS_ROMTYPE_OBGYN_ALL_OB
Patient is here to follow up.  Patient is status post left femoral to tibial  artery bypass graft with non-reverse greater saphenous vein. S/p PTA/stent of the proximal and distal anastomosis  Doing very well.  No complaints or concerns. Denies any significant pain. Still has some left toes numbness.  No fever, chills, runny nose, diarrhea, constipation, no abdominal pain, dysuria, no urgency.    Physical Examination:  Awake alert and oriented times 3 in no apparent distress.    Visit Vitals  /74   Pulse 70   Ht 5' 10\" (1.778 m)   Wt 77.6 kg   BMI 24.54 kg/m²     Left  lower extremity warm to touch.  Palpable pedal pulses. There is minimal edema around ankles.  Incisions are well healed.      Graft surveillance:    There was a prior study dated 08/29/2018.    The right ZARINA performed today was normal at 1.03.    The left ZARINA performed today was mildly reduced at 0.87.    Normal left distal SFA-Tibioperoneal trunk bypass graft without overt  evidence of stenosis. Patent stents at the proximal and distal bypass with  moderately elevated in-stent velocities, but without overt stenosis. Stable.    A fluid collection, likely a seroma, measuring 3.2 x 4.3 cm, was noted in the  left groin. This is known.    Assessment and Plan: Satisfactory recovery after left femoral to tibial  artery bypass graft with non-reverse greater saphenous vein. S/p PTA/stent of the proximal and distal anastomosis. No surgical issues now. Bypass is widely patent.  Follow up in 6 moth  with graft surveillance.     AROM

## 2019-07-12 NOTE — DISCHARGE NOTE OB - PATIENT PORTAL LINK FT
You can access the [facility] Patient Portal, offered by [facility], by registering with the following website: [email or web address]

## 2019-07-13 LAB
BASOPHILS # BLD AUTO: 0.03 K/UL — SIGNIFICANT CHANGE UP (ref 0–0.2)
BASOPHILS NFR BLD AUTO: 0.3 % — SIGNIFICANT CHANGE UP (ref 0–2)
EOSINOPHIL # BLD AUTO: 0.18 K/UL — SIGNIFICANT CHANGE UP (ref 0–0.5)
EOSINOPHIL NFR BLD AUTO: 2 % — SIGNIFICANT CHANGE UP (ref 0–6)
HCT VFR BLD CALC: 26.3 % — LOW (ref 34.5–45)
HGB BLD-MCNC: 8.9 G/DL — LOW (ref 11.5–15.5)
IMM GRANULOCYTES NFR BLD AUTO: 1.2 % — SIGNIFICANT CHANGE UP (ref 0–1.5)
LYMPHOCYTES # BLD AUTO: 0.96 K/UL — LOW (ref 1–3.3)
LYMPHOCYTES # BLD AUTO: 10.9 % — LOW (ref 13–44)
MCHC RBC-ENTMCNC: 30.3 PG — SIGNIFICANT CHANGE UP (ref 27–34)
MCHC RBC-ENTMCNC: 33.8 % — SIGNIFICANT CHANGE UP (ref 32–36)
MCV RBC AUTO: 89.5 FL — SIGNIFICANT CHANGE UP (ref 80–100)
MONOCYTES # BLD AUTO: 0.54 K/UL — SIGNIFICANT CHANGE UP (ref 0–0.9)
MONOCYTES NFR BLD AUTO: 6.1 % — SIGNIFICANT CHANGE UP (ref 2–14)
NEUTROPHILS # BLD AUTO: 7 K/UL — SIGNIFICANT CHANGE UP (ref 1.8–7.4)
NEUTROPHILS NFR BLD AUTO: 79.5 % — HIGH (ref 43–77)
NRBC # FLD: 0 K/UL — SIGNIFICANT CHANGE UP (ref 0–0)
PLATELET # BLD AUTO: 148 K/UL — LOW (ref 150–400)
PMV BLD: 10.4 FL — SIGNIFICANT CHANGE UP (ref 7–13)
RBC # BLD: 2.94 M/UL — LOW (ref 3.8–5.2)
RBC # FLD: 12.8 % — SIGNIFICANT CHANGE UP (ref 10.3–14.5)
WBC # BLD: 8.82 K/UL — SIGNIFICANT CHANGE UP (ref 3.8–10.5)
WBC # FLD AUTO: 8.82 K/UL — SIGNIFICANT CHANGE UP (ref 3.8–10.5)

## 2019-07-13 RX ORDER — IBUPROFEN 200 MG
600 TABLET ORAL EVERY 6 HOURS
Refills: 0 | Status: DISCONTINUED | OUTPATIENT
Start: 2019-07-13 | End: 2019-07-16

## 2019-07-13 RX ORDER — OXYCODONE HYDROCHLORIDE 5 MG/1
5 TABLET ORAL
Refills: 0 | Status: DISCONTINUED | OUTPATIENT
Start: 2019-07-13 | End: 2019-07-16

## 2019-07-13 RX ORDER — OXYCODONE HYDROCHLORIDE 5 MG/1
5 TABLET ORAL ONCE
Refills: 0 | Status: DISCONTINUED | OUTPATIENT
Start: 2019-07-13 | End: 2019-07-13

## 2019-07-13 RX ADMIN — Medication 30 MILLIGRAM(S): at 05:54

## 2019-07-13 RX ADMIN — OXYCODONE HYDROCHLORIDE 5 MILLIGRAM(S): 5 TABLET ORAL at 23:15

## 2019-07-13 RX ADMIN — Medication 600 MILLIGRAM(S): at 20:00

## 2019-07-13 RX ADMIN — Medication 975 MILLIGRAM(S): at 00:30

## 2019-07-13 RX ADMIN — HEPARIN SODIUM 5000 UNIT(S): 5000 INJECTION INTRAVENOUS; SUBCUTANEOUS at 17:44

## 2019-07-13 RX ADMIN — Medication 30 MILLIGRAM(S): at 12:00

## 2019-07-13 RX ADMIN — Medication 30 MILLIGRAM(S): at 11:23

## 2019-07-13 RX ADMIN — Medication 600 MILLIGRAM(S): at 19:16

## 2019-07-13 RX ADMIN — HEPARIN SODIUM 5000 UNIT(S): 5000 INJECTION INTRAVENOUS; SUBCUTANEOUS at 05:53

## 2019-07-13 RX ADMIN — Medication 30 MILLIGRAM(S): at 00:38

## 2019-07-13 RX ADMIN — OXYCODONE HYDROCHLORIDE 5 MILLIGRAM(S): 5 TABLET ORAL at 20:00

## 2019-07-13 RX ADMIN — Medication 30 MILLIGRAM(S): at 01:10

## 2019-07-13 RX ADMIN — OXYCODONE HYDROCHLORIDE 5 MILLIGRAM(S): 5 TABLET ORAL at 22:33

## 2019-07-13 RX ADMIN — Medication 30 MILLIGRAM(S): at 06:25

## 2019-07-13 RX ADMIN — OXYCODONE HYDROCHLORIDE 5 MILLIGRAM(S): 5 TABLET ORAL at 11:23

## 2019-07-13 RX ADMIN — OXYCODONE HYDROCHLORIDE 5 MILLIGRAM(S): 5 TABLET ORAL at 19:16

## 2019-07-13 RX ADMIN — Medication 975 MILLIGRAM(S): at 17:45

## 2019-07-13 NOTE — PROGRESS NOTE ADULT - REASON FOR ADMISSION
mono di aedv
mono di
C/S
mono di tiup
mono di tiup aedv
mono di tiup aedv
mono-di, AEDV
pprom

## 2019-07-13 NOTE — PROGRESS NOTE ADULT - ASSESSMENT
A/P: 30yo POD#1 s/p LTCS.  Patient is stable and doing well post-operatively.
Assessment/Plan  28 yo  @ 32+4 with mono-di TIUP presents for close inpatient monitoring due to mono-di TIUP c/b AEDV of baby B and IUGR
Assessment/Plan  28 yo HD#10  @ 33w5d with mono-di TIUP presents for close inpatient monitoring due to mono-di TIUP c/b AEDV of baby B and IUGR. VSS.
Assessment/Plan  28 yo HD#4 @ 32+5 with mono-di TIUP presents for close inpatient monitoring due to mono-di TIUP c/b AEDV of baby B and IUGR
Assessment/Plan  28 yo HD#8  @ 33w4d with mono-di TIUP presents for close inpatient monitoring due to mono-di TIUP c/b AEDV of baby B and IUGR. VSS.
Assessment/Plan  30 yo  @ 32+5 with mono-di TIUP presents for close inpatient monitoring due to mono-di TIUP c/b AEDV of baby B and IUGR
Assessment/Plan  30 yo HD#11  @ 34+0 with mono-di TIUP presents for close inpatient monitoring due to mono-di TIUP c/b AEDV of baby B and IUGR. VSS.
Assessment/Plan  30 yo HD#6  @ 33w1d with mono-di TIUP presents for close inpatient monitoring due to mono-di TIUP c/b AEDV of baby B and IUGR. VSS. .
Assessment/Plan  30 yo HD#7  @ 33w2d with mono-di TIUP presents for close inpatient monitoring due to mono-di TIUP c/b AEDV of baby B and IUGR. VSS.
Assessment/Plan  30 yo HD#7  @ 33w3d with mono-di TIUP presents for close inpatient monitoring due to mono-di TIUP c/b AEDV of baby B and IUGR. VSS.
Assessment/Plan  30 yo HD#4 @ 33 with mono-di TIUP presents for close inpatient monitoring due to mono-di TIUP c/b AEDV of baby B and IUGR
Assessment/Plan  28 yo HD#11  @ 33w6d with mono-di TIUP presents for close inpatient monitoring due to mono-di TIUP c/b AEDV of baby B and IUGR. VSS.

## 2019-07-13 NOTE — PROGRESS NOTE ADULT - PROBLEM SELECTOR PROBLEM 1
Twin gestation in third trimester
 delivery delivered
Twin gestation in third trimester

## 2019-07-13 NOTE — PROGRESS NOTE ADULT - PROBLEM SELECTOR PLAN 1
1. Maternal wellbeing  Neuro: no acute issues  Pulm: O2 sat WNL on RA, ambulation  GI: tolerating regular diet  : voiding spontaneously  Heme: SCDs while in bed for DVT ppx, encourage ambulation, HSQ  ppx dose  ID: afebrile, no signs of infection    2. Fetal wellbeing  - NST BID  - ATU testing twice a week, testing 7/2 showed persistent AEDV in baby B with MCA normal and EFW 1062g/0%ile. Overall NST has been reassuring.     Mode of Delivery:  -Plan for c/s 7/15  TLal PGY3
1. Maternal wellbeing  Neuro: no acute issues  Pulm: O2 sat WNL on RA, ambulation  GI: tolerating regular diet, NPO after midnight   : voiding spontaneously  Heme: SCDs while in bed for DVT ppx, encourage ambulation, HSQ  ppx dose, hold dose heparin tomorrow AM  ID: afebrile, no signs of infection    2. Fetal wellbeing  - NST BID  - ATU testing twice a week, testing 7/9 showed persistent AEDV in baby B with MCA normal (7/9): EFW 1082g/0%ile. Overall NST has been reassuring.     Mode of Delivery:  -Plan for c/s 7/12 @10a at 34+0w      TLal PGY3
- Continue regular diet.  - Increase ambulation.  - Continue motrin, tylenol, oxycodone PRN for pain control.   - F/u AM CBC    Heber Arriola PGY1
1. Maternal wellbeing  Neuro: no acute issues  Pulm: O2 sat WNL on RA, ambulation  GI: NPO since 12a  : voiding spontaneously  Heme: SCDs while in bed for DVT ppx, encourage ambulation, HSQ  last dose was 5000 U at 10a 7/11 . CBC and T&S received by lab at 6am   ID: afebrile, no signs of infection    2. Fetal wellbeing  -overall reassuring overnight. for delivery today at 10am    Mode of Delivery:  -Plan for c/s 7/12 @10a at 34+0w      TLal PGY3
1. Maternal wellbeing  Neuro: no acute issues  Pulm: O2 sat WNL on RA, ambulation  GI: tolerating regular diet  : voiding spontaneously  Heme: SCDs while in bed for DVT ppx, encourage ambulation   ID: afebrile, no signs of infection    2. Fetal wellbeing  - NST BID  - ATU testing twice a week    Meena Farris, PGY3
1. Maternal wellbeing  Neuro: no acute issues  Pulm: O2 sat WNL on RA, ambulation  GI: tolerating regular diet  : voiding spontaneously  Heme: SCDs while in bed for DVT ppx, encourage ambulation, HSQ  ppx dose  ID: afebrile, no signs of infection    2. Fetal wellbeing  - NST BID  - ATU testing twice a week, testing 7/2 showed persistent AEDV in baby B with MCA normal and EFW 1062g/0%ile. Overall NST has been reassuring.     Mode of Delivery:  -Plan for c/s 7/15 (at 34w3d)  -Appreciate MFM recommendations    ROSEANNE Courtney PGY3
1. Maternal wellbeing  Neuro: no acute issues  Pulm: O2 sat WNL on RA, ambulation  GI: tolerating regular diet  : voiding spontaneously  Heme: SCDs while in bed for DVT ppx, encourage ambulation, HSQ  ppx dose  ID: afebrile, no signs of infection    2. Fetal wellbeing  - NST BID  - ATU testing twice a week, testing 7/5 showed persistent AEDV in baby B with MCA normal and EFW 1082g/0%ile. Overall NST has been reassuring.     Mode of Delivery:  -Plan for c/s 7/12 @10a at 34+0w      TLal PGY3  dw Dr Valdovinos
1. Maternal wellbeing  Neuro: no acute issues  Pulm: O2 sat WNL on RA, ambulation  GI: tolerating regular diet  : voiding spontaneously  Heme: SCDs while in bed for DVT ppx, encourage ambulation, HSQ  ppx dose  ID: afebrile, no signs of infection    2. Fetal wellbeing  - NST BID  - ATU testing twice a week, testing 7/5 showed persistent AEDV in baby B with MCA normal and EFW 1082g/0%ile. Overall NST has been reassuring.     Mode of Delivery:  -Plan for c/s 7/15 (at 34w3d)  - will discuss with MFM regarding delivery on 7/13 vs 7/15  -Appreciate MFM recommendations      TLal PGY3
1. Maternal wellbeing  Neuro: no acute issues  Pulm: O2 sat WNL on RA, ambulation  GI: tolerating regular diet  : voiding spontaneously  Heme: SCDs while in bed for DVT ppx, encourage ambulation, HSQ  ppx dose  ID: afebrile, no signs of infection    2. Fetal wellbeing  - NST BID  - ATU testing twice a week, testing 7/5 showed persistent AEDV in baby B with MCA normal and EFW 1082g/0%ile. Overall NST has been reassuring.     Mode of Delivery:  -Plan for c/s 7/15 (at 34w3d)  -Appreciate MFM recommendations      TLal PGY3
1. Maternal wellbeing  Neuro: no acute issues  Pulm: O2 sat WNL on RA, ambulation  GI: tolerating regular diet  : voiding spontaneously  Heme: SCDs while in bed for DVT ppx, encourage ambulation, HSQ  ppx dose  ID: afebrile, no signs of infection    2. Fetal wellbeing  - NST BID  - ATU testing twice a week, testing 7/9 showed persistent AEDV in baby B with MCA normal (7/5): EFW 1082g/0%ile. Overall NST has been reassuring.     Mode of Delivery:  -Plan for c/s 7/12 @10a at 34+0w      TLal PGY3
1. Maternal wellbeing  Neuro: no acute issues  Pulm: O2 sat WNL on RA, ambulation  GI: tolerating regular diet  : voiding spontaneously  Heme: SCDs while in bed for DVT ppx, encourage ambulation, begin HSQ ppx dose  ID: afebrile, no signs of infection    2. Fetal wellbeing  - NST BID  - ATU testing twice a week, testing yesterday showed persistent AEDV in baby B with MCA normal and EFW 1062g/0%ile. Overall NST has been reassuring.     Meena Farris, PGY3
1. Maternal wellbeing  Neuro: no acute issues  Pulm: O2 sat WNL on RA, ambulation  GI: tolerating regular diet  : voiding spontaneously  Heme: SCDs while in bed for DVT ppx, encourage ambulation,HSQ  ppx dose  ID: afebrile, no signs of infection    2. Fetal wellbeing  - NST BID  - ATU testing twice a week, testing yesterday showed persistent AEDV in baby B with MCA normal and EFW 1062g/0%ile. Overall NST has been reassuring.     Mode of Delivery:  -Plan for c/s 7/15    ROSEANNE Courtney PGY3

## 2019-07-14 RX ORDER — FERROUS SULFATE 325(65) MG
325 TABLET ORAL THREE TIMES A DAY
Refills: 0 | Status: DISCONTINUED | OUTPATIENT
Start: 2019-07-14 | End: 2019-07-16

## 2019-07-14 RX ORDER — ASCORBIC ACID 60 MG
500 TABLET,CHEWABLE ORAL DAILY
Refills: 0 | Status: DISCONTINUED | OUTPATIENT
Start: 2019-07-14 | End: 2019-07-16

## 2019-07-14 RX ADMIN — Medication 975 MILLIGRAM(S): at 19:06

## 2019-07-14 RX ADMIN — Medication 600 MILLIGRAM(S): at 19:43

## 2019-07-14 RX ADMIN — HEPARIN SODIUM 5000 UNIT(S): 5000 INJECTION INTRAVENOUS; SUBCUTANEOUS at 17:58

## 2019-07-14 RX ADMIN — HEPARIN SODIUM 5000 UNIT(S): 5000 INJECTION INTRAVENOUS; SUBCUTANEOUS at 06:15

## 2019-07-14 RX ADMIN — Medication 600 MILLIGRAM(S): at 01:15

## 2019-07-14 RX ADMIN — Medication 975 MILLIGRAM(S): at 19:43

## 2019-07-14 RX ADMIN — Medication 600 MILLIGRAM(S): at 06:14

## 2019-07-14 RX ADMIN — OXYCODONE HYDROCHLORIDE 5 MILLIGRAM(S): 5 TABLET ORAL at 06:15

## 2019-07-14 RX ADMIN — OXYCODONE HYDROCHLORIDE 5 MILLIGRAM(S): 5 TABLET ORAL at 07:00

## 2019-07-14 RX ADMIN — Medication 500 MILLIGRAM(S): at 12:54

## 2019-07-14 RX ADMIN — Medication 975 MILLIGRAM(S): at 01:15

## 2019-07-14 RX ADMIN — Medication 975 MILLIGRAM(S): at 00:29

## 2019-07-14 RX ADMIN — Medication 600 MILLIGRAM(S): at 00:27

## 2019-07-14 RX ADMIN — Medication 325 MILLIGRAM(S): at 12:54

## 2019-07-14 RX ADMIN — Medication 600 MILLIGRAM(S): at 12:53

## 2019-07-14 RX ADMIN — Medication 600 MILLIGRAM(S): at 19:05

## 2019-07-14 RX ADMIN — Medication 975 MILLIGRAM(S): at 12:54

## 2019-07-14 RX ADMIN — Medication 975 MILLIGRAM(S): at 07:00

## 2019-07-14 RX ADMIN — Medication 975 MILLIGRAM(S): at 13:54

## 2019-07-14 RX ADMIN — Medication 100 MILLIGRAM(S): at 06:15

## 2019-07-14 RX ADMIN — Medication 600 MILLIGRAM(S): at 13:53

## 2019-07-14 RX ADMIN — Medication 975 MILLIGRAM(S): at 06:15

## 2019-07-14 RX ADMIN — Medication 600 MILLIGRAM(S): at 07:00

## 2019-07-15 RX ORDER — ACETAMINOPHEN 500 MG
3 TABLET ORAL
Qty: 0 | Refills: 0 | DISCHARGE
Start: 2019-07-15

## 2019-07-15 RX ADMIN — Medication 975 MILLIGRAM(S): at 14:02

## 2019-07-15 RX ADMIN — Medication 975 MILLIGRAM(S): at 20:12

## 2019-07-15 RX ADMIN — Medication 600 MILLIGRAM(S): at 05:00

## 2019-07-15 RX ADMIN — Medication 325 MILLIGRAM(S): at 20:12

## 2019-07-15 RX ADMIN — Medication 100 MILLIGRAM(S): at 10:14

## 2019-07-15 RX ADMIN — Medication 600 MILLIGRAM(S): at 22:17

## 2019-07-15 RX ADMIN — HEPARIN SODIUM 5000 UNIT(S): 5000 INJECTION INTRAVENOUS; SUBCUTANEOUS at 05:55

## 2019-07-15 RX ADMIN — HEPARIN SODIUM 5000 UNIT(S): 5000 INJECTION INTRAVENOUS; SUBCUTANEOUS at 16:35

## 2019-07-15 RX ADMIN — Medication 600 MILLIGRAM(S): at 03:55

## 2019-07-15 RX ADMIN — Medication 325 MILLIGRAM(S): at 00:44

## 2019-07-15 RX ADMIN — Medication 975 MILLIGRAM(S): at 05:56

## 2019-07-15 RX ADMIN — Medication 975 MILLIGRAM(S): at 21:12

## 2019-07-15 RX ADMIN — Medication 600 MILLIGRAM(S): at 17:10

## 2019-07-15 RX ADMIN — Medication 600 MILLIGRAM(S): at 16:35

## 2019-07-15 RX ADMIN — Medication 500 MILLIGRAM(S): at 14:02

## 2019-07-15 RX ADMIN — Medication 600 MILLIGRAM(S): at 11:00

## 2019-07-15 RX ADMIN — Medication 600 MILLIGRAM(S): at 10:14

## 2019-07-15 RX ADMIN — Medication 975 MILLIGRAM(S): at 15:00

## 2019-07-15 RX ADMIN — Medication 325 MILLIGRAM(S): at 14:02

## 2019-07-15 RX ADMIN — Medication 600 MILLIGRAM(S): at 23:17

## 2019-07-15 RX ADMIN — Medication 975 MILLIGRAM(S): at 00:45

## 2019-07-15 RX ADMIN — Medication 975 MILLIGRAM(S): at 01:00

## 2019-07-16 VITALS
HEART RATE: 73 BPM | SYSTOLIC BLOOD PRESSURE: 126 MMHG | TEMPERATURE: 98 F | DIASTOLIC BLOOD PRESSURE: 78 MMHG | RESPIRATION RATE: 17 BRPM | OXYGEN SATURATION: 100 %

## 2019-07-16 RX ADMIN — Medication 975 MILLIGRAM(S): at 02:17

## 2019-07-16 RX ADMIN — Medication 600 MILLIGRAM(S): at 07:14

## 2019-07-16 RX ADMIN — Medication 975 MILLIGRAM(S): at 09:00

## 2019-07-16 RX ADMIN — Medication 975 MILLIGRAM(S): at 03:17

## 2019-07-16 RX ADMIN — Medication 600 MILLIGRAM(S): at 06:14

## 2019-07-16 RX ADMIN — HEPARIN SODIUM 5000 UNIT(S): 5000 INJECTION INTRAVENOUS; SUBCUTANEOUS at 06:14

## 2019-07-16 RX ADMIN — Medication 975 MILLIGRAM(S): at 08:20

## 2019-07-17 DIAGNOSIS — O36.5931 MATERNAL CARE FOR OTHER KNOWN OR SUSPECTED POOR FETAL GROWTH, THIRD TRIMESTER, FETUS 1: ICD-10-CM

## 2019-07-17 DIAGNOSIS — O30.033 TWIN PREGNANCY, MONOCHORIONIC/DIAMNIOTIC, THIRD TRIMESTER: ICD-10-CM

## 2019-07-17 DIAGNOSIS — O36.5932 MATERNAL CARE FOR OTHER KNOWN OR SUSPECTED POOR FETAL GROWTH, THIRD TRIMESTER, FETUS 2: ICD-10-CM

## 2019-07-17 DIAGNOSIS — Z3A.32 32 WEEKS GESTATION OF PREGNANCY: ICD-10-CM

## 2020-07-29 ENCOUNTER — RESULT REVIEW (OUTPATIENT)
Age: 30
End: 2020-07-29

## 2020-09-08 NOTE — OB PROVIDER H&P - NS_DILATION_OBGYN_ALL_OB_NU
Detail Level: Detailed Add 90026 Cpt? (Important Note: In 2017 The Use Of 01091 Is Being Tracked By Cms To Determine Future Global Period Reimbursement For Global Periods): no 1

## 2021-04-20 NOTE — PROGRESS NOTE ADULT - PROBLEM/PLAN-1
He had a normal echo. CONCLUSIONS:   -Two-dimensional transthoracic echocardiography was performed using standard views \T\ projections with M-mode and Doppler (continuous, pulsed wave, spectral \T\ color flow). -The left ventricle is normal in size.
NA 11am.
Ok I sent in 30 and saw that there was a doppler done and no clot in the leg!
Wife advised, appointment scheduled with Dr Beatris Hannah next Monday.
Wife advised. She said that she called [facility] to see if they can come and help. She said that he is more confused lately. She said that they have full bottle of Spirinolactone and she wants to know if he should continue.  She said that his u
Wife said that patient had Lasix for 7 days and the left leg is still swollen. He did not take this while at [facility]. She asked if  Dr Mildred Mares will reorder this for him to [address] [place] [address].
Would like to discuss getting her  out of [address] today.  Call her after 9 am this morning
DISPLAY PLAN FREE TEXT

## 2022-03-19 NOTE — OB RN PREOPERATIVE CHECKLIST - TYPE OF SOLUTION
Infectious Diseases Daily Progress Note      Patient's Name: Gt Philippe   Date of Service: 3/19/2022     Date of admission: 3/14/2022                Hospital Day: 6  Principal Diagnosis:                             Gt Philippe who is a 88 year old male admitted 3/14/2022  4:37 PM with   Osteomyelitis of fifth toe of right foot (CMS/HCC)  (primary encounter diagnosis)  PAD (peripheral artery disease) (CMS/[facility])  Gait instability                       Scheduled Medications   aspirin, 81 mg, Daily  clopidogrel, 75 mg, Daily  Phosphorus Standard Replacement Protocol, , See Admin Instructions  ceFAZolin, 2,000 mg, 3 times per day  sodium chloride (PF), 2 mL, 2 times per day  allopurinol, 300 mg, Daily  AMIODarone, 200 mg, Daily  dorzolamide-timolol, 1 drop, BID  levothyroxine, 50 mcg, QAM AC  pravastatin, 80 mg, Nightly  latanoprost, 1 drop, Nightly  lisinopril, 40 mg, Daily  WARFARIN - PHARMACIST MONITORED, , See Admin Instructions  Potassium Standard Replacement Protocol, , See Admin Instructions  Magnesium Standard Replacement Protocol, , See Admin Instructions  insulin lispro, , TID WC  insulin lispro, , Nightly      Past Medical History, Social History, Medications and Allergies reviewed.   Reviewed Pertinent: Laboratory studies, radiographic studies, medications, and recent progress notes.     Subjective:  No new complaints.  Denies any pain.    Review of Systems: No fever or chills. No nausea, abdominal pain, or diarrhea. No rashes. No cough or chest pain. No urinary symptoms.     Objective:    VITAL SIGNS  Temp  Min: 97.7 °F (36.5 °C)  Max: 99.4 °F (37.4 °C)  Temp:  [97.7 °F (36.5 °C)-99.4 °F (37.4 °C)] 98.6 °F (37 °C)  Heart Rate:  [46-60] 52  Resp:  [14-18] 14  BP: ()/(55-84) 102/59   Physical examination:  General : Awake, no acute distress, appears comfortable  Head:  PERRL, No icterus, no oral thrush  Neck supple, no LAD   Pulm: Clear to auscultation, no wheezes, no rales  GI: Abdomen is soft  , non tender, bowel sounds are normal.  No organomegaly  : No lemon Catheter. No CVA or suprapubic tenderness.    CVS:  Pulse regular, S1, S2 normal, no m/g/r   Extremities:  Right foot is bandaged  Skin: No rashes  Lines:  PIV   MSK: No major joint swelling , pain, erythema, effusion.         Laboratory data, microbiology, imaging:    Recent Labs   Lab 03/19/22  0517 03/18/22  0519 03/16/22  0545   WBC 8.7 5.0 6.5   HGB 9.4* 9.6* 9.6*   HCT 29.4* 30.2* 30.6*    152 164     Recent Labs   Lab 03/19/22  0517 03/18/22  0519 03/16/22  0545 03/15/22  0548 03/14/22 1958 03/14/22 1706   SODIUM  --  138 139 139  --  136   POTASSIUM 4.0 3.9 4.3 4.9  --  5.0   BUN  --  29* 31* 32*  --  34*   CREATININE  --  1.19* 1.22* 1.14  --  1.29*   GLUCOSE  --  99 117* 103*  --  113*   CALCIUM  --  8.7 8.6 8.5  --  9.0   ALBUMIN  --  3.0* 3.1* 3.3*  --  3.8   AST  --  11 11 18  --  10   GPT  --  12 16 18  --  20   ALKPT  --  79 81 80  --  92   BILIRUBIN  --  0.4 0.4 0.4  --  0.4   RESR  --   --   --   --  15 14   CRP  --   --   --   --  <0.3 <0.3       Recent Labs     04/27/21  1339 03/14/22  1706 03/14/22 1958   RESR 2 14 15   CRP 0.8 <0.3 <0.3     Patients's last HBA1C reading was   Hemoglobin A1C (%)   Date Value   12/10/2021 6.1 (H)       No results found   No results found  Recent Labs   Lab 03/14/22 1958 03/14/22 1706   CRP <0.3 <0.3     Recent Labs   Lab 03/18/22  0519 03/16/22  0545 03/15/22  0548 03/14/22  1706   AST 11 11 18 10   GPT 12 16 18 20   ALKPT 79 81 80 92   BILIRUBIN 0.4 0.4 0.4 0.4         Microbiology:  Rapid COVID-19 PCR is negative on 03/14/2022  Anaerobic aerobic culture of the right foot wound from 03/14/2022 shows no polys, Gram-positive cocci     Radiology/Imaging:   IR Angiogram Extremity Right   Final Result by Isiah Hunter MD (03/18 1612)   IMPRESSION:    1. Multifocal right anterior tibial artery, tibioperoneal trunk and   peroneal artery stenosis treated with PTA establishing two-vessel    straight-line flow right lower extremity   2.         XR FOOT RIGHT AP LATERAL AND OBLIQUE   Final Result by Mariusz Phelps MD (03/17 2527)   IMPRESSION: Amputation fifth toe the level of the metatarsal phalangeal   joint.      Mildly displaced old fracture base of the fifth metatarsal.      Bunion formation head of the first metatarsal. Valgus angulation first   through fourth proximal phalanx.      IR Consult Service to IR   Final Result by Dharmesh Bautista  (03/15 6839)      MRI FOOT RIGHT   Final Result by Buzz Souza MD (03/16 3823)   IMPRESSION:      Osteomyelitis of the fifth digit from the level of the mid portion of the   proximal phalanx through the base of the distal phalanx with septic   involvement of the PIP and DIP joints. There is no drainable fluid   collection.      US Lower Extremity Arteries Duplex Right   Final Result by Ed Laguerre MD (03/15 2191)   IMPRESSION:  Mild occlusive disease in the right superficial femoral   artery. Severe trifurcation disease right calf. Dominant runoff vessel is   the right peroneal artery. The distal right anterior tibial artery exhibits   severe occlusive disease which might be amenable to endovascular therapy.         TECHNOLOGIST: [facility]            Us Vasc Iliac Arteries Duplex Bilateral   Final Result by Ed Laguerre MD (03/15 5167)   IMPRESSION:  Mild occlusive disease left common and external iliac   arteries.         TECHNOLOGIST: [facility]            XR Toe 2+ View Right   Final Result by Justin Zuluaga MD (03/14 4348)   IMPRESSION: Fracture dislocation at the interphalangeal joint of the right   great toe. This may be a subacute or chronic finding.      In this setting, osteomyelitis would be difficult to exclude. Clinical   correlation is needed.            Assessment:     Right diabetic foot infection in the setting of diabetic foot ulcer of 5th toe  Osteomyelitis of fifth toe of right foot.  No signs of sepsis.  CRP normal.  Status  post amputation 03/17/2020  Dislocated fracture of right foot 5th toe interphalangeal joint  Peripheral vascular disease   Chronic kidney disease stage 3     Plan & Recommendations:     Switced to oral cephalexin  Okay to discharge from Infectious Disease standpoint       JORDEN Gonzalez MD  Infectious Diseases  [phone] (P)   [phone] (O)  3/19/2022     12:14 PM          LR

## 2023-01-18 NOTE — PROGRESS NOTE ADULT - SUBJECTIVE AND OBJECTIVE BOX
ANESTHESIA POSTOP CHECK    29y Female POSTOP DAY 1 S/P     Vital Signs Last 24 Hrs  T(C): 36.7 (13 Jul 2019 14:05), Max: 37.4 (12 Jul 2019 18:10)  T(F): 98 (13 Jul 2019 14:05), Max: 99.4 (12 Jul 2019 18:10)  HR: 95 (13 Jul 2019 14:05) (71 - 98)  BP: 92/62 (13 Jul 2019 14:05) (92/62 - 112/64)  BP(mean): --  RR: 16 (13 Jul 2019 14:05) (16 - 17)  SpO2: 99% (13 Jul 2019 14:05) (98% - 100%)  I&O's Summary    12 Jul 2019 07:01  -  13 Jul 2019 07:00  --------------------------------------------------------  IN: 3775 mL / OUT: 5120 mL / NET: -1345 mL        [x ] NO APPARENT ANESTHESIA COMPLICATIONS      Comments:
32 week twins with know sga. AEDF. Admitted by [facility] for in hospital monitoring  Patient reports good fetal activity  S/P steroids  C/S scheduled for 7/15
Antepartum Progress Note    HD#2    Pt seen and evaluated at bedside.   Reports no acute events overnight.      No contractions, abdominal pain, vaginal bleeding, loss of fluid, endorses normal fetal movements.    Exam  T(C): 36.8 (07-02 @ 11:37), Max: 36.8 (07-02 @ 11:34)  HR: 88 (07-02 @ 14:10) (81 - 88)  BP: 102/59 (07-02 @ 14:10) (99/54 - 108/70)  RR: 18 (07-02 @ 11:37) (17 - 18)  SpO2: 98% (07-02 @ 11:37) (98% - 98%)  Gen: No acute distress  CV: Regular rate and rhythm  Pulm: CTABL  Abd: gravid, non tender   Ext: Nontender bilaterally    07-02-19 @ 07:01  -  07-02-19 @ 14:14  --------------------------------------------------------  IN: 480 mL / OUT: 0 mL / NET: 480 mL            aspirin enteric coated 81 milliGRAM(s) Oral daily  prenatal multivitamin 1 Tablet(s) Oral daily
Antepartum Progress Note    HD#3    Pt seen and evaluated at bedside.   Reports no acute events overnight.    No contractions, abdominal pain, vaginal bleeding, loss of fluid, endorses normal fetal movements.    Exam  T(C): 36.8 (07-02 @ 11:37), Max: 36.8 (07-02 @ 11:34)  HR: 88 (07-02 @ 14:10) (81 - 88)  BP: 102/59 (07-02 @ 14:10) (99/54 - 108/70)  RR: 18 (07-02 @ 11:37) (17 - 18)  SpO2: 98% (07-02 @ 11:37) (98% - 98%)  Gen: No acute distress  CV: Regular rate and rhythm  Pulm: CTABL  Abd: gravid, non tender   Ext: Nontender bilaterally    07-02-19 @ 07:01  -  07-02-19 @ 14:14  --------------------------------------------------------  IN: 480 mL / OUT: 0 mL / NET: 480 mL            aspirin enteric coated 81 milliGRAM(s) Oral daily  prenatal multivitamin 1 Tablet(s) Oral daily
OB Progress Note:  Delivery, POD#1    S: 28yo POD#1 s/p LTCS . Her pain is well controlled. She is tolerating a regular diet but not passing flatus. Denies N/V. Denies CP/SOB/lightheadedness/dizziness.   She is ambulating without difficulty. Voiding spontaneously.     O:   Vital Signs Last 24 Hrs  T(C): 36.6 (2019 06:06), Max: 37.4 (2019 18:10)  T(F): 97.8 (2019 06:06), Max: 99.4 (2019 18:10)  HR: 71 (2019 06:06) (68 - 104)  BP: 105/70 (2019 06:06) (95/56 - 135/77)  BP(mean): 72 (2019 14:30) (72 - 111)  RR: 17 (2019 06:06) (10 - 19)  SpO2: 100% (2019 06:06) (98% - 100%)    Labs:  Blood type: AB Positive  Rubella IgG: RPR: Negative                          8.9<L>   8.82 >-----------< 148<L>    (  @ 05:30 )             26.3<L>                        12.6   9.54 >-----------< 184    (  @ 06:45 )             37.4                  PE:  General: NAD  Abdomen: Mildly distended, appropriately tender, incision c/d/i.  Extremities: No erythema, no pitting edema
Patient seen   ,No acute complaints. Pt reports +FM, denies LOF, VB, ctx, HA, epigastric pain, blurred vision, CP, SOB, N/V, fevers, and chills.    General: NAD  Abdomen: Soft, non-tender, gravid  Ext: No pain or swelling    NST reactive     MEDICATIONS  (STANDING):  aspirin enteric coated 81 milliGRAM(s) Oral daily  heparin  Injectable 5000 Unit(s) SubCutaneous every 12 hours  prenatal multivitamin 1 Tablet(s) Oral daily    Assessment/Plan  30 yo HD#6  @ 33w2d with mono-di TIUP presents for close inpatient monitoring    mono-di TIUP c/b AEDV of baby B and IUGR. VSS. .
Postop Day  __1_ s/p   C- Section    THERAPY:  [ x ] Spinal morphine   [  ] Epidural morphine   [  ] IV PCA Hydromorphone 1 mg/ml      Sedation Score:	  x[  ] Alert	    [  ] Drowsy        [  ] Arousable	[  ] Asleep	[  ] Unresponsive    Side Effects:	  [  x] None	     [  ] Nausea        [  ] Pruritus        [  ] Weakness   [  ] Numbness        ASSESSMENT/ PLAN   [   ] Discontinue         [  ] Continue  [ x ]Documentation and Verification of current medications     Comments:
Postpartum Note,  Section  She is a  29y woman who is now post-operative day: 1    Subjective:  The patient feels well.  She is ambulating.   She is tolerating regular diet.  She denies nausea and vomiting.  She is voiding.  Her pain is controlled.  She reports normal postpartum bleeding.  She is breastfeeding.  She is formula feeding.    Physical exam:    Vital Signs Last 24 Hrs  T(C): 36.6 (2019 06:06), Max: 37.4 (2019 18:10)  T(F): 97.8 (2019 06:06), Max: 99.4 (2019 18:10)  HR: 71 (2019 06:06) (68 - 104)  BP: 105/70 (2019 06:06) (95/56 - 135/77)  BP(mean): 72 (2019 14:30) (72 - 111)  RR: 17 (2019 06:06) (10 - 19)  SpO2: 100% (2019 06:06) (98% - 100%)    Gen: NAD  Breast: Soft, nontender, not engorged.  Abdomen: Soft, nontender, no distension , firm uterine fundus at umbilicus.  Incision: Clean, dry, and intact with steri strips  Pelvic: Normal lochia noted  Ext: No calf tenderness    LABS:                        12.6   9.54  )-----------( 184      ( 2019 06:45 )             37.4       Rubella status:     Allergies    No Known Allergies    Intolerances      MEDICATIONS  (STANDING):  acetaminophen   Tablet .. 975 milliGRAM(s) Oral <User Schedule>  diphtheria/tetanus/pertussis (acellular) Vaccine (ADAcel) 0.5 milliLiter(s) IntraMuscular once  heparin  Injectable 5000 Unit(s) SubCutaneous every 12 hours  ibuprofen  Tablet. 600 milliGRAM(s) Oral every 6 hours  ketorolac   Injectable 30 milliGRAM(s) IV Push every 6 hours  morphine PF Spinal 0.15 milliGRAM(s) IntraThecal. once    MEDICATIONS  (PRN):  butorphanol Injectable 0.125 milliGRAM(s) IV Push every 6 hours PRN Pruritus  diphenhydrAMINE 25 milliGRAM(s) Oral every 6 hours PRN Itching  docusate sodium 100 milliGRAM(s) Oral two times a day PRN Stool softening  glycerin Suppository - Adult 1 Suppository(s) Rectal at bedtime PRN Constipation  HYDROmorphone  Injectable 1 milliGRAM(s) IV Push every 3 hours PRN Severe Pain (7 - 10)  lanolin Ointment 1 Application(s) Topical every 6 hours PRN Sore Nipples  magnesium hydroxide Suspension 30 milliLiter(s) Oral two times a day PRN Constipation  naloxone Injectable 0.1 milliGRAM(s) IV Push every 3 minutes PRN For ANY of the following changes in patient status:  A. RR LESS THAN 10 breaths per minute, B. Oxygen saturation LESS THAN 90%, C. Sedation score of 6  ondansetron Injectable 4 milliGRAM(s) IV Push every 6 hours PRN Nausea  oxyCODONE    IR 5 milliGRAM(s) Oral every 3 hours PRN Mild Pain (1 - 3)  oxyCODONE    IR 10 milliGRAM(s) Oral every 3 hours PRN Moderate Pain (4 - 6)  oxyCODONE    IR 5 milliGRAM(s) Oral every 3 hours PRN Moderate to Severe Pain (4-10)  oxyCODONE    IR 5 milliGRAM(s) Oral once PRN Moderate to Severe Pain (4-10)  simethicone 80 milliGRAM(s) Chew every 4 hours PRN Gas        Assessment and Plan  POD # 1 s/p  section  Doing well.  Encourage ambulation.
Postpartum Note,  Section  She is a  29y woman who is now post-operative day: 2    Subjective:  The patient feels well.  She is ambulating.   She is tolerating regular diet.  She denies nausea and vomiting.  She is voiding.  Her pain is controlled.  She reports normal postpartum bleeding.  She is breastfeeding.  She is formula feeding.    Physical exam:    Vital Signs Last 24 Hrs  T(C): 36.6 (2019 06:09), Max: 37.8 (2019 18:00)  T(F): 97.9 (2019 06:09), Max: 100 (2019 18:00)  HR: 73 (2019 06:09) (73 - 104)  BP: 106/66 (2019 06:09) (92/62 - 108/68)  BP(mean): --  RR: 18 (2019 06:09) (16 - 18)  SpO2: 96% (2019 06:09) (96% - 100%)    Gen: NAD  Breast: Soft, nontender, not engorged.  Abdomen: Soft, nontender, no distension , firm uterine fundus at umbilicus.  Incision: Clean, dry, and intact with steri strips  Pelvic: Normal lochia noted  Ext: No calf tenderness    LABS:                        8.9    8.82  )-----------( 148      ( 2019 05:30 )             26.3       Rubella status:     Allergies    No Known Allergies    Intolerances      MEDICATIONS  (STANDING):  acetaminophen   Tablet .. 975 milliGRAM(s) Oral <User Schedule>  ascorbic acid 500 milliGRAM(s) Oral daily  diphtheria/tetanus/pertussis (acellular) Vaccine (ADAcel) 0.5 milliLiter(s) IntraMuscular once  ferrous    sulfate 325 milliGRAM(s) Oral three times a day  heparin  Injectable 5000 Unit(s) SubCutaneous every 12 hours  ibuprofen  Tablet. 600 milliGRAM(s) Oral every 6 hours    MEDICATIONS  (PRN):  diphenhydrAMINE 25 milliGRAM(s) Oral every 6 hours PRN Itching  docusate sodium 100 milliGRAM(s) Oral two times a day PRN Stool softening  glycerin Suppository - Adult 1 Suppository(s) Rectal at bedtime PRN Constipation  lanolin Ointment 1 Application(s) Topical every 6 hours PRN Sore Nipples  magnesium hydroxide Suspension 30 milliLiter(s) Oral two times a day PRN Constipation  oxyCODONE    IR 5 milliGRAM(s) Oral every 3 hours PRN Moderate to Severe Pain (4-10)  oxyCODONE    IR 5 milliGRAM(s) Oral once PRN Moderate to Severe Pain (4-10)  simethicone 80 milliGRAM(s) Chew every 4 hours PRN Gas        Assessment and Plan  POD # 2 s/p  section  Doing well.  Encourage ambulation.
R3 Antepartum Note, HD#10    Interval events: no events overnight.  NST reactive    Patient seen and examined at bedside, No acute complaints. Pt reports +FM, denies LOF, VB, ctx,. Denies HA, epigastric pain, blurred vision, CP, SOB, N/V, fevers, and chills.    Vital Signs Last 24 Hours  T(C): 36.7 (07-06-19 @ 05:57), Max: 36.9 (07-05-19 @ 14:14)  HR: 75 (07-06-19 @ 05:57) (66 - 102)  BP: 104/57 (07-06-19 @ 05:57) (104/57 - 122/68)  RR: 16 (07-06-19 @ 05:57) (15 - 17)  SpO2: 98% (07-06-19 @ 05:57) (97% - 99%)    CAPILLARY BLOOD GLUCOSE          Physical Exam:  General: NAD  Abdomen: Soft, non-tender, gravid  Ext: No pain or swelling    A: baseline 140 mod dl +accel no decel  B: baseline 130, mod dl +accel + variable decels     Labs:    T&S 7/8- AB+      MEDICATIONS  (STANDING):  aspirin enteric coated 81 milliGRAM(s) Oral daily  heparin  Injectable 5000 Unit(s) SubCutaneous every 12 hours  prenatal multivitamin 1 Tablet(s) Oral daily    MEDICATIONS  (PRN):
R3 Antepartum Note, HD#12    Interval events: overnight on NST baby B with variable decels.  NST: baseline 130, mod dl +accels +variable decels.  Upon review of previous NSTs it is similar from the past few days. PT reports +FM, denies LOF/VB.  Pt is scheduled for C/S today at 10am.     Patient seen and examined at bedside, No acute complaints. Pt reports +FM, denies LOF, VB, ctx,. Denies HA, epigastric pain, blurred vision, CP, SOB, N/V, fevers, and chills.    Vital Signs Last 24 Hours  T(C): 36.7 (07-06-19 @ 05:57), Max: 36.9 (07-05-19 @ 14:14)  HR: 75 (07-06-19 @ 05:57) (66 - 102)  BP: 104/57 (07-06-19 @ 05:57) (104/57 - 122/68)  RR: 16 (07-06-19 @ 05:57) (15 - 17)  SpO2: 98% (07-06-19 @ 05:57) (97% - 99%)    CAPILLARY BLOOD GLUCOSE          Physical Exam:  General: NAD  Abdomen: Soft, non-tender, gravid  Ext: No pain or swelling    A: baseline 145 mod dl +accel + pccas variable decel    Labs:    T&S 7/8- AB+      MEDICATIONS  (STANDING):  aspirin enteric coated 81 milliGRAM(s) Oral daily  heparin  Injectable 5000 Unit(s) SubCutaneous every 12 hours  prenatal multivitamin 1 Tablet(s) Oral daily    MEDICATIONS  (PRN):
R3 Antepartum Note, HD#4    Interval events: No acute events overngiht    Patient seen and examined at bedside. No acute complaints. Pt reports +FM, -LOV, -VB, -Ctx. Denies epigastric pain, blurred vision, CP, SOB, N/V, fevers, and chills.    Vital Signs Last 24 Hours  T(C): 36.7 (07-04-19 @ 05:24), Max: 36.7 (07-03-19 @ 10:02)  HR: 61 (07-04-19 @ 05:24) (61 - 92)  BP: 118/55 (07-04-19 @ 05:24) (101/50 - 118/55)  RR: 18 (07-04-19 @ 05:24) (17 - 18)  SpO2: 98% (07-04-19 @ 05:24) (98% - 98%)    CAPILLARY BLOOD GLUCOSE          Physical Exam:  General: NAD  Abdomen: Soft, non-tender, gravid  Ext: No pain or swelling    NST reactive overnight    Labs:          PTT - ( 07-01 @ 21:25 )  PTT:24.0 SEC    Uric Acid: (07-01 @ 21:25)  4.3      Fibrinogen: (07-01 @ 21:25)  587.3    LDH: (07-01 @ 21:25)  --         MEDICATIONS  (STANDING):  aspirin enteric coated 81 milliGRAM(s) Oral daily  heparin  Injectable 5000 Unit(s) SubCutaneous every 12 hours  prenatal multivitamin 1 Tablet(s) Oral daily    MEDICATIONS  (PRN):
R3 Antepartum Note, HD#6    Interval events: no events overnight    Patient seen and examined at bedside,No acute complaints. Pt reports +FM, denies LOF, VB, ctx, HA, epigastric pain, blurred vision, CP, SOB, N/V, fevers, and chills.    Vital Signs Last 24 Hours  T(C): 36.7 (07-06-19 @ 05:57), Max: 36.9 (07-05-19 @ 14:14)  HR: 75 (07-06-19 @ 05:57) (66 - 102)  BP: 104/57 (07-06-19 @ 05:57) (104/57 - 122/68)  RR: 16 (07-06-19 @ 05:57) (15 - 17)  SpO2: 98% (07-06-19 @ 05:57) (97% - 99%)    CAPILLARY BLOOD GLUCOSE          Physical Exam:  General: NAD  Abdomen: Soft, non-tender, gravid  Ext: No pain or swelling    NST reactive overnight    Labs:                MEDICATIONS  (STANDING):  aspirin enteric coated 81 milliGRAM(s) Oral daily  heparin  Injectable 5000 Unit(s) SubCutaneous every 12 hours  prenatal multivitamin 1 Tablet(s) Oral daily    MEDICATIONS  (PRN):
R3 Antepartum Note, HD#7    Interval events: no events overnight.  NST reactive, NST with ctx q2-5 but pt did not feel any of the ctx.    Patient seen and examined at bedside, No acute complaints. Pt reports +FM, denies LOF, VB, ctx,. Denies HA, epigastric pain, blurred vision, CP, SOB, N/V, fevers, and chills.    Vital Signs Last 24 Hours  T(C): 36.7 (07-06-19 @ 05:57), Max: 36.9 (07-05-19 @ 14:14)  HR: 75 (07-06-19 @ 05:57) (66 - 102)  BP: 104/57 (07-06-19 @ 05:57) (104/57 - 122/68)  RR: 16 (07-06-19 @ 05:57) (15 - 17)  SpO2: 98% (07-06-19 @ 05:57) (97% - 99%)    CAPILLARY BLOOD GLUCOSE          Physical Exam:  General: NAD  Abdomen: Soft, non-tender, gravid  Ext: No pain or swelling    NST reactive overnight    Labs:                MEDICATIONS  (STANDING):  aspirin enteric coated 81 milliGRAM(s) Oral daily  heparin  Injectable 5000 Unit(s) SubCutaneous every 12 hours  prenatal multivitamin 1 Tablet(s) Oral daily    MEDICATIONS  (PRN):
R3 Antepartum Note, HD#8    Interval events: no events overnight.  NST reactive    Patient seen and examined at bedside, No acute complaints. Pt reports +FM, denies LOF, VB, ctx,. Denies HA, epigastric pain, blurred vision, CP, SOB, N/V, fevers, and chills.    Vital Signs Last 24 Hours  T(C): 36.7 (07-06-19 @ 05:57), Max: 36.9 (07-05-19 @ 14:14)  HR: 75 (07-06-19 @ 05:57) (66 - 102)  BP: 104/57 (07-06-19 @ 05:57) (104/57 - 122/68)  RR: 16 (07-06-19 @ 05:57) (15 - 17)  SpO2: 98% (07-06-19 @ 05:57) (97% - 99%)    CAPILLARY BLOOD GLUCOSE          Physical Exam:  General: NAD  Abdomen: Soft, non-tender, gravid  Ext: No pain or swelling    NST reactive overnight    Labs:                MEDICATIONS  (STANDING):  aspirin enteric coated 81 milliGRAM(s) Oral daily  heparin  Injectable 5000 Unit(s) SubCutaneous every 12 hours  prenatal multivitamin 1 Tablet(s) Oral daily    MEDICATIONS  (PRN):
R3 Antepartum Note, HD#9    Interval events: no events overnight.  NST reactive    Patient seen and examined at bedside, No acute complaints. Pt reports +FM, denies LOF, VB, ctx,. Denies HA, epigastric pain, blurred vision, CP, SOB, N/V, fevers, and chills.    Vital Signs Last 24 Hours  T(C): 36.7 (07-06-19 @ 05:57), Max: 36.9 (07-05-19 @ 14:14)  HR: 75 (07-06-19 @ 05:57) (66 - 102)  BP: 104/57 (07-06-19 @ 05:57) (104/57 - 122/68)  RR: 16 (07-06-19 @ 05:57) (15 - 17)  SpO2: 98% (07-06-19 @ 05:57) (97% - 99%)    CAPILLARY BLOOD GLUCOSE          Physical Exam:  General: NAD  Abdomen: Soft, non-tender, gravid  Ext: No pain or swelling    NST reactive overnight    Labs:    T&S 7/8- AB+      MEDICATIONS  (STANDING):  aspirin enteric coated 81 milliGRAM(s) Oral daily  heparin  Injectable 5000 Unit(s) SubCutaneous every 12 hours  prenatal multivitamin 1 Tablet(s) Oral daily    MEDICATIONS  (PRN):
Reports good fetal activity  c/o abd pain in am not now  c/o dark stool
SUBJECTIVE:    Pain: Controlled    Complaints: None    MILESTONES:    Alert and Oriented x 3  [ x ]  Out of bed/ ambulating. [ x ]  Flatus:   Positive [ x ]  Negative [  ]  Bowel movement  [  ] Positive [  ] Negative   Voiding [x  ] Due to void [  ]   Hoffman/Indwelling catheter in place [  ]  Diet: Regular [ x ]  Clears [  ]  NPO [  ]    Infant feeding:  Breast [  ]   Bottle [  ]  Both [ X ]  Feeding related issues and/or concerns:      OBJECTIVE:  T(C): 36.9 (19 @ 05:42), Max: 36.9 (07-15-19 @ 14:07)  HR: 73 (19 @ 05:42) (73 - 100)  BP: 126/78 (19 @ 05:42) (110/80 - 126/78)  RR: 17 (19 @ 05:42) (17 - 18)  SpO2: 100% (19 @ 05:42) (99% - 100%)  Wt(kg): --          Blood Type: AB Positive    RPR: Negative          MEDICATIONS  (STANDING):  acetaminophen   Tablet .. 975 milliGRAM(s) Oral <User Schedule>  ascorbic acid 500 milliGRAM(s) Oral daily  diphtheria/tetanus/pertussis (acellular) Vaccine (ADAcel) 0.5 milliLiter(s) IntraMuscular once  ferrous    sulfate 325 milliGRAM(s) Oral three times a day  heparin  Injectable 5000 Unit(s) SubCutaneous every 12 hours  ibuprofen  Tablet. 600 milliGRAM(s) Oral every 6 hours    MEDICATIONS  (PRN):  diphenhydrAMINE 25 milliGRAM(s) Oral every 6 hours PRN Itching  docusate sodium 100 milliGRAM(s) Oral two times a day PRN Stool softening  glycerin Suppository - Adult 1 Suppository(s) Rectal at bedtime PRN Constipation  lanolin Ointment 1 Application(s) Topical every 6 hours PRN Sore Nipples  magnesium hydroxide Suspension 30 milliLiter(s) Oral two times a day PRN Constipation  oxyCODONE    IR 5 milliGRAM(s) Oral every 3 hours PRN Moderate to Severe Pain (4-10)  oxyCODONE    IR 5 milliGRAM(s) Oral once PRN Moderate to Severe Pain (4-10)  simethicone 80 milliGRAM(s) Chew every 4 hours PRN Gas        ASSESSMENT:    29y     G   1   P  1002       PO Day#  4        Delivery: Primary [ X ]    Repeat [  ]       EBL - 1200                                  Indication of procedure:   Breech, 32+ wks. Gestation    Condition: Stable    Past Medical History significant for: HPI:  28 yo  @ 32+3 presents for close inpatient monitoring due to mono-di TIUP c/b AEDV of baby B and IUGR. Pt was previously admitted on  due to r/o PTL and at that time received BMZx2 (), Mg and Ampicillin.  Due to unchanged cervical exam pt was sent home with close monitoring in ATU 3x/week. reports +FM denies LOF/VB. overall feels well.     Today, ATU sono(): A- 8/8 vtx, doppler wnl, ant placenta, 1512g/4th%ile  B-  transverse, IUGR, intermittent AEDV, 1082 grams/0%ile, ant placent  28% discordance     PMSH: denies  Meds: ASA, PNV  All: NKDA    PE:   SVE: 60/-3  FHT: A- baseline 150 mod dl +accel no decel  B- baseline 140 mod dl+accel no decel  [place]: irreg, ctx q2-3 min (2019 19:14)      Current Issues:    Breasts:  Soft [x  ]   Engorged [  ]  Nipples:  Abdomen: Soft [ x ]   Distended [  ] Nontender [  ]     Bowel sounds :  Present [  ]  Absent [  ]   Fundus:  Firm [x  ]  Boggy [  ]    Abdominal incision: Clean, Dry and Intact [x  ]  Staples [  ] Steri Strips [  ] Dermabond [ X ] Sutures [  ]    Patient wearing abdominal binder for support.    Vaginal: Lochia:  Heavy [  ]  Moderate [ x ]   Scant [  ]    Extremities: Edema [  ] Negative Sarai's Sign [  ] Nontender Connor  [ x ] Positive pedal pulses [  ]    Other relevant physical exam findings:      PLAN:    Plan: Increase ambulation, analgesia PRN and pain medication protocol standing oxycodone, ibuprofen and acetaminophen.    Diet: Regular diet    Continue routine post-operative and postpartum care.     Discharge Planning [ x ]    For discharge Today  [ X   ]    Consults:  Social Work [  ]  Lactation [ x ]  Other [         ]
She is a  29y woman who is now post-operative day: 3    Subjective:  Pt without complaints.  Pain contolled.  +ambulating.  +void.    Tolerating regular diet.  No nausea/vomiting. Lochia WNL.    Objective:  Vital Signs Last 24 Hrs  T(C): 37 (2019 21:35), Max: 37 (2019 21:35)  T(F): 98.6 (2019 21:35), Max: 98.6 (2019 21:35)  HR: 96 (2019 21:35) (73 - 96)  BP: 119/77 (2019 21:35) (106/66 - 119/77)  BP(mean): --  RR: 18 (2019 21:35) (18 - 18)  SpO2: 99% (2019 21:35) (96% - 99%)    Constitutional: NAD  Breast: Soft, nontender, not engorged.  Abdomen: Soft, nontender, no distension.  Uterine fundus firm, non-tender.  Incision: Clean, dry, and intact  Ext: No calf tenderness bilaterally    LABS:                        8.9    8.82  )-----------( 148      ( 2019 05:30 )             26.3                         12.6   9.54  )-----------( 184      ( 2019 06:45 )             37.4         Allergies    No Known Allergies    Intolerances      MEDICATIONS  (STANDING):  acetaminophen   Tablet .. 975 milliGRAM(s) Oral <User Schedule>  ascorbic acid 500 milliGRAM(s) Oral daily  diphtheria/tetanus/pertussis (acellular) Vaccine (ADAcel) 0.5 milliLiter(s) IntraMuscular once  ferrous    sulfate 325 milliGRAM(s) Oral three times a day  heparin  Injectable 5000 Unit(s) SubCutaneous every 12 hours  ibuprofen  Tablet. 600 milliGRAM(s) Oral every 6 hours    MEDICATIONS  (PRN):  diphenhydrAMINE 25 milliGRAM(s) Oral every 6 hours PRN Itching  docusate sodium 100 milliGRAM(s) Oral two times a day PRN Stool softening  glycerin Suppository - Adult 1 Suppository(s) Rectal at bedtime PRN Constipation  lanolin Ointment 1 Application(s) Topical every 6 hours PRN Sore Nipples  magnesium hydroxide Suspension 30 milliLiter(s) Oral two times a day PRN Constipation  oxyCODONE    IR 5 milliGRAM(s) Oral every 3 hours PRN Moderate to Severe Pain (4-10)  oxyCODONE    IR 5 milliGRAM(s) Oral once PRN Moderate to Severe Pain (4-10)  simethicone 80 milliGRAM(s) Chew every 4 hours PRN Gas        Assessment and Plan  Pt stable POD #3 s/p  section  -continue routine postoperative and postpartum care  -encourage ambulation  -analgesia prn  -discharge home  -advised
abdominal pain resolved spontaneously  stool no longer dark  reports good fetal movement  Plan: continue management as per MFM
no complaints  vss  reports good fetal mvmnts x 2  plan unchanged  jdh
patient reports good fetal movement  no bleeding or cramps  continue management as per M
R3 Antepartum Note, HD#5    Interval events: No acute events     Patient seen and examined at bedside. No acute complaints. Pt reports +FM, -LOV, -VB, -Ctx. Denies epigastric pain, blurred vision, CP, SOB, N/V, fevers, and chills.    Vital Signs Last 24 Hours  T(C): 36.7 (07-04-19 @ 05:24), Max: 36.7 (07-03-19 @ 10:02)  HR: 61 (07-04-19 @ 05:24) (61 - 92)  BP: 118/55 (07-04-19 @ 05:24) (101/50 - 118/55)  RR: 18 (07-04-19 @ 05:24) (17 - 18)  SpO2: 98% (07-04-19 @ 05:24) (98% - 98%)    CAPILLARY BLOOD GLUCOSE          Physical Exam:  General: NAD  Abdomen: Soft, non-tender, gravid  Ext: No pain or swelling    NST reactive overnight    Labs:          PTT - ( 07-01 @ 21:25 )  PTT:24.0 SEC    Uric Acid: (07-01 @ 21:25)  4.3      Fibrinogen: (07-01 @ 21:25)  587.3    LDH: (07-01 @ 21:25)  --         MEDICATIONS  (STANDING):  aspirin enteric coated 81 milliGRAM(s) Oral daily  heparin  Injectable 5000 Unit(s) SubCutaneous every 12 hours  prenatal multivitamin 1 Tablet(s) Oral daily    MEDICATIONS  (PRN):
R3 Antepartum Note, HD#11    Interval events: no events overnight.  NST reactive    Patient seen and examined at bedside, No acute complaints. Pt reports +FM, denies LOF, VB, ctx,. Denies HA, epigastric pain, blurred vision, CP, SOB, N/V, fevers, and chills.    Vital Signs Last 24 Hours  T(C): 36.7 (07-06-19 @ 05:57), Max: 36.9 (07-05-19 @ 14:14)  HR: 75 (07-06-19 @ 05:57) (66 - 102)  BP: 104/57 (07-06-19 @ 05:57) (104/57 - 122/68)  RR: 16 (07-06-19 @ 05:57) (15 - 17)  SpO2: 98% (07-06-19 @ 05:57) (97% - 99%)    CAPILLARY BLOOD GLUCOSE          Physical Exam:  General: NAD  Abdomen: Soft, non-tender, gravid  Ext: No pain or swelling    A: baseline 140 mod ld +accel no decel  B: baseline 130, mod dl +accel + variable decels     Labs:    T&S 7/8- AB+      MEDICATIONS  (STANDING):  aspirin enteric coated 81 milliGRAM(s) Oral daily  heparin  Injectable 5000 Unit(s) SubCutaneous every 12 hours  prenatal multivitamin 1 Tablet(s) Oral daily    MEDICATIONS  (PRN):
Monthly or less

## 2023-05-30 NOTE — ED ADULT NURSE NOTE - CAS DISCH CONDITION
----- Message from Deya Estrella MD sent at 5/26/2023  5:59 PM CDT -----  Labs are normal and at goal except the following: Urine shows a little protein, diabetes is controlled, vitamin D level low, triglycerides above goal.  Please keep next appointment as scheduled to discuss further     Improved DC instructions

## 2023-10-05 NOTE — OB RN INTRAOPERATIVE NOTE - NS_VAGINABETAPREP_OBGYN_ALL_OB
The following message was sent to [facility] staff; Hi, cAN You please call ms cervantes to set up a post partum mood check appt (pt reports hx of anx/dep) thank you. Note: she will check bp daily w/connect mom     Yes

## 2024-01-07 NOTE — OB PROVIDER H&P - CURRENT PREGNANCY COMPLICATIONS, OB PROFILE
Discharge Instructions:   - Advance activity as tolerated. General diet. May shower.   - Pelvic rest for 2 weeks, incl. no intercourse, tampon use or douching. No soaking in baths or pools for 2 weeks.   - NO DRIVING WHILE TAKING NARCOTICS   - Strict return precautions reviewed:   Pt counseled to call office for the following prior to PP follow-up:   - Pain that is not adequately controlled by pain medications prescribed   - Fever of 100.4F (38C) or higher   - Chest pain or shortness of breath, pain or tenderness in the leg   - Foul-smelling vaginal discharge  - Vaginal bleeding saturating more than 1 sanitary pad per hour for 2 consecutive hours or if pt feels lightheaded or dizzy  - All questions answered, pt amenable to plan of care    
Multiple Gestation/Gestational Age less than 36 Weeks

## 2024-09-29 NOTE — OB RN PATIENT PROFILE - NS_FETALCONCERNS_OBGYN_ALL_OB_FT
Problem: Noninvasive Ventilation Acute  Goal: Effective Unassisted Ventilation and Oxygenation  Outcome: Progressing   Goal Outcome Evaluation:                                               mono/ di twins  A" BPP: 8/8 vtx 1297 grams anterior placenta   "B" EFW: 954 grams anterior placenta